# Patient Record
Sex: FEMALE | Race: WHITE | Employment: FULL TIME | URBAN - METROPOLITAN AREA
[De-identification: names, ages, dates, MRNs, and addresses within clinical notes are randomized per-mention and may not be internally consistent; named-entity substitution may affect disease eponyms.]

---

## 2017-02-10 ENCOUNTER — TELEPHONE (OUTPATIENT)
Dept: FAMILY MEDICINE | Facility: CLINIC | Age: 22
End: 2017-02-10

## 2017-02-10 DIAGNOSIS — Z30.41 ENCOUNTER FOR SURVEILLANCE OF CONTRACEPTIVE PILLS: Primary | ICD-10-CM

## 2017-02-10 RX ORDER — NORETHINDRONE ACETATE AND ETHINYL ESTRADIOL .03; 1.5 MG/1; MG/1
1 TABLET ORAL DAILY
Qty: 84 TABLET | Refills: 1 | Status: SHIPPED | OUTPATIENT
Start: 2017-02-10 | End: 2017-08-31

## 2017-08-31 DIAGNOSIS — Z30.41 ENCOUNTER FOR SURVEILLANCE OF CONTRACEPTIVE PILLS: ICD-10-CM

## 2017-08-31 NOTE — TELEPHONE ENCOUNTER
Microgestin     Last Written Prescription Date: 02/10/17  Last Fill Quantity: 84,  # refills: 1   Last Office Visit with [facility], [facility] or [facility] prescribing provider: 10/03/16

## 2017-09-06 RX ORDER — NORETHINDRONE ACETATE AND ETHINYL ESTRADIOL .03; 1.5 MG/1; MG/1
1 TABLET ORAL DAILY
Qty: 30 TABLET | Refills: 0 | Status: SHIPPED | OUTPATIENT
Start: 2017-09-06 | End: 2017-09-11

## 2017-09-06 NOTE — TELEPHONE ENCOUNTER
Patient left a voicemail today that we were unable to retrieve until earlier this evening. Message was left at 4:02pm requesting for a refill. Please call to discuss.    Lizet WALTERS  Central Scheduler

## 2017-09-11 ENCOUNTER — OFFICE VISIT (OUTPATIENT)
Dept: FAMILY MEDICINE | Facility: CLINIC | Age: 22
End: 2017-09-11
Payer: COMMERCIAL

## 2017-09-11 VITALS
SYSTOLIC BLOOD PRESSURE: 110 MMHG | BODY MASS INDEX: 26.12 KG/M2 | DIASTOLIC BLOOD PRESSURE: 75 MMHG | WEIGHT: 153 LBS | TEMPERATURE: 97.8 F | HEART RATE: 76 BPM | HEIGHT: 64 IN

## 2017-09-11 DIAGNOSIS — Z00.00 ROUTINE GENERAL MEDICAL EXAMINATION AT A HEALTH CARE FACILITY: Primary | ICD-10-CM

## 2017-09-11 DIAGNOSIS — Z30.41 ENCOUNTER FOR SURVEILLANCE OF CONTRACEPTIVE PILLS: ICD-10-CM

## 2017-09-11 PROCEDURE — 99395 PREV VISIT EST AGE 18-39: CPT | Performed by: NURSE PRACTITIONER

## 2017-09-11 PROCEDURE — 87491 CHLMYD TRACH DNA AMP PROBE: CPT | Performed by: NURSE PRACTITIONER

## 2017-09-11 PROCEDURE — 87591 N.GONORRHOEAE DNA AMP PROB: CPT | Performed by: NURSE PRACTITIONER

## 2017-09-11 RX ORDER — NORETHINDRONE ACETATE AND ETHINYL ESTRADIOL .03; 1.5 MG/1; MG/1
1 TABLET ORAL DAILY
Qty: 84 TABLET | Refills: 3 | Status: SHIPPED | OUTPATIENT
Start: 2017-09-11 | End: 2018-11-21

## 2017-09-11 NOTE — MR AVS SNAPSHOT
"              After Visit Summary   9/11/2017    Jackelin Durno    MRN: [id number]           Patient Information     Date Of Birth          1995        Visit Information        Provider Department      9/11/2017 10:40 AM Lizeth Weems APRN CNP [facility]        Today's Diagnoses     Routine general medical examination at a health care facility    -  1    Encounter for surveillance of contraceptive pills           Follow-ups after your visit        Who to contact     If you have questions or need follow up information about today's clinic visit or your schedule please contact [facility] directly at [phone].  Normal or non-critical lab and imaging results will be communicated to you by AquaMobile[employer], letter or phone within 4 business days after the clinic has received the results. If you do not hear from us within 7 days, please contact the clinic through AquaMobilehart or phone. If you have a critical or abnormal lab result, we will notify you by phone as soon as possible.  Submit refill requests through [employer] or call your pharmacy and they will forward the refill request to us. Please allow 3 business days for your refill to be completed.          Additional Information About Your Visit        MyChart Information     [employer] gives you secure access to your electronic health record. If you see a primary care provider, you can also send messages to your care team and make appointments. If you have questions, please call your primary care clinic.  If you do not have a primary care provider, please call [phone] and they will assist you.        Care EveryWhere ID     This is your Care EveryWhere ID. This could be used by other organizations to access your [place] medical records  [id number]        Your Vitals Were     Pulse Temperature Height Last Period BMI (Body Mass Index)       76 97.8  F (36.6  C) (Oral) 5' 4\" (1.626 m) 09/03/2017 (Exact Date) 26.26 kg/m2     "    Blood Pressure from Last 3 Encounters:   09/11/17 110/75   10/03/16 101/68   07/20/16 106/69    Weight from Last 3 Encounters:   09/11/17 153 lb (69.4 kg)   10/03/16 146 lb (66.2 kg)   10/12/15 142 lb (64.4 kg) (72 %)*     * Growth percentiles are based on [facility] 2-20 Years data.              We Performed the Following     CHLAMYDIA TRACHOMATIS PCR     NEISSERIA GONORRHOEA PCR          Today's Medication Changes          These changes are accurate as of: 9/11/17 10:58 AM.  If you have any questions, ask your nurse or doctor.               These medicines have changed or have updated prescriptions.        Dose/Directions    norethindrone-ethinyl estradiol 1.5-30 MG-MCG per tablet   Commonly known as:  MICROGESTIN   This may have changed:  additional instructions   Used for:  Encounter for surveillance of contraceptive pills   Changed by:  Lizeth Weems APRN CNP        Dose:  1 tablet   Take 1 tablet by mouth daily   Quantity:  84 tablet   Refills:  3            Where to get your medicines      These medications were sent to [facility] [address] [place] [address] [address] AT [facility] [place] [address], [facility] [address]     Phone:  [phone]     norethindrone-ethinyl estradiol 1.5-30 MG-MCG per tablet                Primary Care Provider Office Phone # Fax #    MAGUE Jeter [phone] [address] [facility] [address]        Equal Access to Services     NIMESH JEFFERSON : Hadii aad ku hadasho Soomaali, waaxda luqadaha, qaybta kaalmada adeegyada, waxay danial bhatt. So [facility] [phone].    ATENCIÓN: Si habla español, tiene a sharma disposición servicios gratuitos de asistencia lingüística. Roque al [phone].    We comply with applicable federal civil rights laws and [place] laws. We do not discriminate on the basis of race, color, national origin, age, disability sex, sexual orientation or  gender identity.            Thank you!     Thank you for choosing [facility]  for your care. Our goal is always to provide you with excellent care. Hearing back from our patients is one way we can continue to improve our services. Please take a few minutes to complete the written survey that you may receive in the mail after your visit with us. Thank you!             Your Updated Medication List - Protect others around you: Learn how to safely use, store and throw away your medicines at www.disposemymeds.org.          This list is accurate as of: 9/11/17 10:58 AM.  Always use your most recent med list.                   Brand Name Dispense Instructions for use Diagnosis    NO ACTIVE MEDICATIONS           norethindrone-ethinyl estradiol 1.5-30 MG-MCG per tablet    MICROGESTIN    84 tablet    Take 1 tablet by mouth daily    Encounter for surveillance of contraceptive pills

## 2017-09-11 NOTE — NURSING NOTE
"Chief Complaint   Patient presents with     Physical       Initial /75  Pulse 76  Temp 97.8  F (36.6  C) (Oral)  Ht 5' 4\" (1.626 m)  Wt 153 lb (69.4 kg)  LMP 09/03/2017 (Exact Date)  BMI 26.26 kg/m2 Estimated body mass index is 26.26 kg/(m^2) as calculated from the following:    Height as of this encounter: 5' 4\" (1.626 m).    Weight as of this encounter: 153 lb (69.4 kg).  Medication Reconciliation: complete  "

## 2017-09-11 NOTE — PROGRESS NOTES
SUBJECTIVE:   CC: Jackelin Duron is an 21 year old woman who presents for preventive health visit.     Physical   Annual:     Getting at least 3 servings of Calcium per day::  Yes    Bi-annual eye exam::  Yes    Dental care twice a year::  Yes    Sleep apnea or symptoms of sleep apnea::  None    Diet::  Regular (no restrictions)    Frequency of exercise::  2-3 days/week    Duration of exercise::  30-45 minutes    Taking medications regularly::  Yes    Medication side effects::  None    Additional concerns today::  YES        Today's PHQ-2 Score:   PHQ-2 ( 1999 [employer]) 9/10/2017   Q1: Little interest or pleasure in doing things 0   Q2: Feeling down, depressed or hopeless 0   PHQ-2 Score 0   Q1: Little interest or pleasure in doing things Not at all   Q2: Feeling down, depressed or hopeless Not at all   PHQ-2 Score 0       Abuse: Current or Past(Physical, Sexual or Emotional)- No  Do you feel safe in your environment - Yes    Social History   Substance Use Topics     Smoking status: Never Smoker     Smokeless tobacco: Never Used     Alcohol use Yes     The patient does not drink >3 drinks per day nor >7 drinks per week.    Reviewed orders with patient.  Reviewed health maintenance and updated orders accordingly - Yes          Pertinent mammograms are reviewed under the imaging tab.    History of abnormal Pap smear: NO - age 21-29 PAP every 3 years recommended    Reviewed and updated as needed this visit by clinical staff  Tobacco  Allergies  Meds         Reviewed and updated as needed this visit by Provider              ROS:C: NEGATIVE for fever, chills, change in weight  I: NEGATIVE for worrisome rashes, moles or lesions  E: NEGATIVE for vision changes or irritation  ENT: NEGATIVE for ear, mouth and throat problems  R: NEGATIVE for significant cough or SOB  B: NEGATIVE for masses, tenderness or discharge  CV: NEGATIVE for chest pain, palpitations or peripheral edema  GI: NEGATIVE for nausea, abdominal  "pain, heartburn, or change in bowel habits  : NEGATIVE for unusual urinary or vaginal symptoms. Periods are regular.  M: NEGATIVE for significant arthralgias or myalgia  N: NEGATIVE for weakness, dizziness or paresthesias  P: NEGATIVE for changes in mood or affect     OBJECTIVE:   /75  Pulse 76  Temp 97.8  F (36.6  C) (Oral)  Ht 5' 4\" (1.626 m)  Wt 153 lb (69.4 kg)  LMP 09/03/2017 (Exact Date)  BMI 26.26 kg/m2  EXAM: Patient has period with heavy flow today - declines pelvic exam; clothes kept on.  GENERAL: healthy, alert and no distress  EYES: Eyes grossly normal to inspection, PERRL and conjunctivae and sclerae normal  HENT: ear canals and TM's normal, nose and mouth without ulcers or lesions  NECK: no adenopathy, no asymmetry, masses, or scars and thyroid normal to palpation  RESP: lungs clear to auscultation - no rales, rhonchi or wheezes  CV: regular rate and rhythm, normal S1 S2, no S3 or S4, no murmur, click or rub, no peripheral edema and peripheral pulses strong  ABDOMEN: soft, nontender, no hepatosplenomegaly, no masses and bowel sounds normal  MS: no gross musculoskeletal defects noted, no edema  SKIN: no suspicious lesions or rashes  NEURO: Normal strength and tone, mentation intact and speech normal  PSYCH: mentation appears normal, affect normal/bright    ASSESSMENT/PLAN:       ICD-10-CM    1. Routine general medical examination at a health care facility Z00.00 NEISSERIA GONORRHOEA PCR     CHLAMYDIA TRACHOMATIS PCR   2. Encounter for surveillance of contraceptive pills Z30.41 norethindrone-ethinyl estradiol (MICROGESTIN) 1.5-30 MG-MCG per tablet       COUNSELING:  Reviewed preventive health counseling, as reflected in patient instructions         reports that she has never smoked. She has never used smokeless tobacco.    Estimated body mass index is 26.26 kg/(m^2) as calculated from the following:    Height as of this encounter: 5' 4\" (1.626 m).    Weight as of this encounter: 153 lb " (69.4 kg).   Weight management plan: Discussed healthy diet and exercise guidelines and patient will follow up in 12 months in clinic to re-evaluate.      The risks, benefits and treatment options of prescribed medications or other treatments have been discussed with the patient. The patient verbalized their understanding and should call or follow up if no improvement or if they develop further problems.    MAGUE Gonzalez [facility]

## 2017-09-12 LAB
C TRACH DNA SPEC QL NAA+PROBE: NEGATIVE
N GONORRHOEA DNA SPEC QL NAA+PROBE: NEGATIVE
SPECIMEN SOURCE: NORMAL
SPECIMEN SOURCE: NORMAL

## 2018-11-21 ENCOUNTER — OFFICE VISIT (OUTPATIENT)
Dept: OBGYN | Facility: CLINIC | Age: 23
End: 2018-11-21
Attending: OBSTETRICS & GYNECOLOGY

## 2018-11-21 ENCOUNTER — APPOINTMENT (OUTPATIENT)
Dept: LAB | Facility: CLINIC | Age: 23
End: 2018-11-21

## 2018-11-21 VITALS
BODY MASS INDEX: 26.5 KG/M2 | DIASTOLIC BLOOD PRESSURE: 78 MMHG | WEIGHT: 155.2 LBS | HEIGHT: 64 IN | SYSTOLIC BLOOD PRESSURE: 111 MMHG | HEART RATE: 77 BPM

## 2018-11-21 DIAGNOSIS — N91.5 OLIGOMENORRHEA, UNSPECIFIED TYPE: ICD-10-CM

## 2018-11-21 DIAGNOSIS — Z00.00 VISIT FOR PREVENTIVE HEALTH EXAMINATION: Primary | ICD-10-CM

## 2018-11-21 DIAGNOSIS — Z11.3 SCREENING EXAMINATION FOR VENEREAL DISEASE: ICD-10-CM

## 2018-11-21 PROCEDURE — 84270 ASSAY OF SEX HORMONE GLOBUL: CPT | Performed by: OBSTETRICS & GYNECOLOGY

## 2018-11-21 PROCEDURE — 87591 N.GONORRHOEAE DNA AMP PROB: CPT | Performed by: OBSTETRICS & GYNECOLOGY

## 2018-11-21 PROCEDURE — 83001 ASSAY OF GONADOTROPIN (FSH): CPT | Performed by: OBSTETRICS & GYNECOLOGY

## 2018-11-21 PROCEDURE — 84443 ASSAY THYROID STIM HORMONE: CPT | Performed by: OBSTETRICS & GYNECOLOGY

## 2018-11-21 PROCEDURE — 84146 ASSAY OF PROLACTIN: CPT | Performed by: OBSTETRICS & GYNECOLOGY

## 2018-11-21 PROCEDURE — G0463 HOSPITAL OUTPT CLINIC VISIT: HCPCS | Mod: ZF

## 2018-11-21 PROCEDURE — 87491 CHLMYD TRACH DNA AMP PROBE: CPT | Performed by: OBSTETRICS & GYNECOLOGY

## 2018-11-21 PROCEDURE — 84403 ASSAY OF TOTAL TESTOSTERONE: CPT | Performed by: OBSTETRICS & GYNECOLOGY

## 2018-11-21 PROCEDURE — 36415 COLL VENOUS BLD VENIPUNCTURE: CPT | Performed by: OBSTETRICS & GYNECOLOGY

## 2018-11-21 NOTE — PROGRESS NOTES
Progress Note    SUBJECTIVE:  Jackelin Duron is an 22 year old, G0, Patient's last menstrual period was 11/12/2018 (approximate)., who requests an Annual Preventive Exam.     Has had cysts on ovaries since high school, was put on OCs to help control that. She recently came off of OCs this past June and has only had three cycles until now. Prior to ever starting OCs, also noted irregular cycles. Several women in her family have been diagnosed with PCOS, and she is wondering if she also has it and what this would mean for future implications, joni fertility.     Getting  soon, thinking of maybe starting a family in 2 years or so.     S/p Gardasil vaccine x 3 per patient  No history of STDs  Sexually active with stephen now, 5 total lifetime partners. Reports has had negative STI testing since being with stephen.      MEDICAL HISTORY:    Current Outpatient Prescriptions on File Prior to Visit:  NO ACTIVE MEDICATIONS      No current facility-administered medications on file prior to visit.   No Known Allergies  Immunization History   Administered Date(s) Administered     DTAP (<7y) 09/04/2001     HPV 08/11/2008, 10/28/2008, 08/04/2010     Influenza (IIV3) PF 11/04/2005, 10/28/2008     MMR 09/04/2001     Meningococcal (Menactra ) 04/24/2013     Meningococcal (Menomune ) 08/11/2008     Poliovirus, inactivated (IPV) 09/04/2001     Tdap (Adacel,Boostrix) 08/11/2008     Varicella 09/04/2001, 08/11/2008       Obstetric History     No data available     Past Medical History:   Diagnosis Date     Concussion 2010    x2     No past surgical history on file.  Family History   Problem Relation Age of Onset     Hypertension Maternal Grandfather      Hypertension Paternal Grandfather      HEART DISEASE Father      wpw     Anxiety Disorder Father      Depression Mother      Anxiety Disorder Mother      Peripheral Vascular Disease Maternal Grandmother      Thyroid Disease Maternal Grandmother      Hyperlipidemia Paternal  "Grandmother      C.A.D. No family hx of      Breast Cancer No family hx of      Social History     Social History     Marital status: Single     Spouse name: N/A     Number of children: N/A     Years of education: N/A     Social History Main Topics     Smoking status: Never Smoker     Smokeless tobacco: Never Used     Alcohol use Yes     Drug use: No     Sexual activity: Yes     Partners: Male     Birth control/ protection: Condom, Pill     Other Topics Concern     Parent/Sibling W/ Cabg, Mi Or Angioplasty Before 65f 55m? No     Social History Narrative        ROS: 10 point ROS neg other than the symptoms noted above in the HPI.      PHYSICAL EXAM:  Height 1.626 m (5' 4\"), weight 70.4 kg (155 lb 3.2 oz), last menstrual period 11/12/2018. Body mass index is 26.64 kg/(m^2).  General - Well-nourished, pleasant female in no acute distress  Skin - no suspicious lesions or rashes  Neck - supple without lymphadenopathy. No thyromegaly or palpable nodules  Lungs - clear to auscultation bilaterally  Heart - regular rate and rhythm without murmur  Abdomen - soft, nontender, nondistended, no masses or organomegaly noted  Neurological - grossly normal strength. Normal mental status    Breast Exam:  Breasts: Without visible skin changes. No dimpling or lesions seen.   Breasts supple, non-tender with palpation, no dominant mass, nodularity, or nipple discharge noted bilaterally. Axillary and supraclavicular nodes negative bilaterally.      Pelvic Exam:  EG/BUS: Normal genital architecture without lesions, erythema or abnormal secretions Bartholin's, Urethra, [place]'s normal   Bladder: no masses or tenderness  Vagina: moist, pink, rugae with creamy, white and odorless secretions  Cervix: pink, moist, closed, without lesion or CMT  Uterus: anteverted, midposition, and small, smooth, firm, mobile w/o pain  Adnexa: within normal limits and no masses, nodularity, tenderness  Rectum: anus normal     Pap done at [facility] last year, " normal per patient    ASSESSMENT:  Encounter Diagnoses   Name Primary?     Oligomenorrhea, unspecified type Yes     Visit for preventive health examination      Screening examination for venereal disease         PLAN:   Orders Placed This Encounter   Procedures     US GYN Complete Transvaginal - 64705 (In Clinic)     Follicle Stimulating Hormone     TSH with free T4 reflex     Testosterone Free and Total     Prolactin     -pelvic/breast exam wnl today (no pap done due to patient report of last years' being normal). GC/CT done today.  -discussed PCOS briefly. Will complete lab/US workup and MyChart with results. We also discussed may be helpful to come in to discuss future implications in detail if she would like, if she meets criteria for diagnosis. We also discussed other screening labs (DM, cholesterol, etc) if she has diagnosis.   -oligomenorrhea - she would like to start OCs again once labwork is completed, to help with regulation of cycles. Has no known contraindications at this time to OCs (MAunt with hx DVT/genetic mutation. Patient's mother has been tested and is negative for mutation).     Will MyChart with lab/US results    Beba Magallanes MD  11/21/2018

## 2018-11-21 NOTE — PATIENT INSTRUCTIONS

## 2018-11-21 NOTE — LETTER
11/21/2018       RE: Jackelin Duron  [address] [facility] [place] [address]     Dear Colleague,    Thank you for referring your patient, Jackelin Duron, to the [facility] at [facility]. Please see a copy of my visit note below.    Progress Note    SUBJECTIVE:  Jackelin Duron is an 22 year old, G0, Patient's last menstrual period was 11/12/2018 (approximate)., who requests an Annual Preventive Exam.     Has had cysts on ovaries since high school, was put on OCs to help control that. She recently came off of OCs this past June and has only had three cycles until now. Prior to ever starting OCs, also noted irregular cycles. Several women in her family have been diagnosed with PCOS, and she is wondering if she also has it and what this would mean for future implications, joni fertility.     Getting  soon, thinking of maybe starting a family in 2 years or so.     S/p Gardasil vaccine x 3 per patient  No history of STDs  Sexually active with stephen now, 5 total lifetime partners. Reports has had negative STI testing since being with stephen.    MEDICAL HISTORY:    Current Outpatient Prescriptions on File Prior to Visit:  NO ACTIVE MEDICATIONS      No current facility-administered medications on file prior to visit.   No Known Allergies  Immunization History   Administered Date(s) Administered     DTAP (<7y) 09/04/2001     HPV 08/11/2008, 10/28/2008, 08/04/2010     Influenza (IIV3) PF 11/04/2005, 10/28/2008     MMR 09/04/2001     Meningococcal (Menactra ) 04/24/2013     Meningococcal (Menomune ) 08/11/2008     Poliovirus, inactivated (IPV) 09/04/2001     Tdap (Adacel,Boostrix) 08/11/2008     Varicella 09/04/2001, 08/11/2008       Obstetric History     No data available     Past Medical History:   Diagnosis Date     Concussion 2010    x2     No past surgical history on file.  Family History   Problem Relation Age of Onset     Hypertension Maternal  "Grandfather      Hypertension Paternal Grandfather      HEART DISEASE Father      wpw     Anxiety Disorder Father      Depression Mother      Anxiety Disorder Mother      Peripheral Vascular Disease Maternal Grandmother      Thyroid Disease Maternal Grandmother      Hyperlipidemia Paternal Grandmother      C.A.D. No family hx of      Breast Cancer No family hx of      Social History     Social History     Marital status: Single     Spouse name: N/A     Number of children: N/A     Years of education: N/A     Social History Main Topics     Smoking status: Never Smoker     Smokeless tobacco: Never Used     Alcohol use Yes     Drug use: No     Sexual activity: Yes     Partners: Male     Birth control/ protection: Condom, Pill     Other Topics Concern     Parent/Sibling W/ Cabg, Mi Or Angioplasty Before 65f 55m? No     Social History Narrative     ROS: 10 point ROS neg other than the symptoms noted above in the HPI.    PHYSICAL EXAM:  Height 1.626 m (5' 4\"), weight 70.4 kg (155 lb 3.2 oz), last menstrual period 11/12/2018. Body mass index is 26.64 kg/(m^2).  General - Well-nourished, pleasant female in no acute distress  Skin - no suspicious lesions or rashes  Neck - supple without lymphadenopathy. No thyromegaly or palpable nodules  Lungs - clear to auscultation bilaterally  Heart - regular rate and rhythm without murmur  Abdomen - soft, nontender, nondistended, no masses or organomegaly noted  Neurological - grossly normal strength. Normal mental status    Breast Exam:  Breasts: Without visible skin changes. No dimpling or lesions seen.   Breasts supple, non-tender with palpation, no dominant mass, nodularity, or nipple discharge noted bilaterally. Axillary and supraclavicular nodes negative bilaterally.      Pelvic Exam:  EG/BUS: Normal genital architecture without lesions, erythema or abnormal secretions Bartholin's, Urethra, [employer]'s normal   Bladder: no masses or tenderness  Vagina: moist, pink, rugae with creamy, " white and odorless secretions  Cervix: pink, moist, closed, without lesion or CMT  Uterus: anteverted, midposition, and small, smooth, firm, mobile w/o pain  Adnexa: within normal limits and no masses, nodularity, tenderness  Rectum: anus normal     Pap done at [facility] last year, normal per patient    ASSESSMENT:  Encounter Diagnoses   Name Primary?     Oligomenorrhea, unspecified type Yes     Visit for preventive health examination      Screening examination for venereal disease       PLAN:   Orders Placed This Encounter   Procedures     US GYN Complete Transvaginal - 54571 (In Clinic)     Follicle Stimulating Hormone     TSH with free T4 reflex     Testosterone Free and Total     Prolactin     -pelvic/breast exam wnl today (no pap done due to patient report of last years' being normal). GC/CT done today.  -discussed PCOS briefly. Will complete lab/US workup and MyChart with results. We also discussed may be helpful to come in to discuss future implications in detail if she would like, if she meets criteria for diagnosis. We also discussed other screening labs (DM, cholesterol, etc) if she has diagnosis.   -oligomenorrhea - she would like to start OCs again once labwork is completed, to help with regulation of cycles. Has no known contraindications at this time to OCs (MAunt with hx DVT/genetic mutation. Patient's mother has been tested and is negative for mutation).     Will MyChart with lab/US results    Beba Magallanes MD  11/21/2018

## 2018-11-21 NOTE — MR AVS SNAPSHOT
After Visit Summary   11/21/2018    Jackelin Duron    MRN: [id number]           Patient Information     Date Of Birth          1995        Visit Information        Provider Department      11/21/2018 3:00 PM Beba Magallanes MD [facility]        Today's Diagnoses     Visit for preventive health examination    -  1    Oligomenorrhea, unspecified type        Screening examination for venereal disease          Care Instructions      PREVENTIVE HEALTH RECOMMENDATIONS:   Most women need a yearly breast and pelvic exam.    A PAP screen, a test done DURING a pelvic exam, is NO longer recommended yearly.    March 2013, screening guidelines recommended by ACOG for PAP screen are:    1) First pap at age 21.    2) Pap every 3 years until age 30.    3) After age 30, pap every 3 years or Pap with HR HPV screen every 5 years until age 65.  4) Women do NOT need a vaginal Pap screen after a hysterectomy (surgical removal of the uterus) when they have not had cancer.    Exceptions:  1) Yearly pap if HIV+ or immunosuppressed secondary to organ transplant  2) BASIL II-III continue routine screening for 20 years.    I encourage you continue looking for opportunities to choose a healthy lifestyle:       * Choose to eat a heart healthy diet. Check out the FOOD PLATE guidelines at: http://www.choosemyplate.gov/ for helpful hints on weight and cholesterol management.  Balance your caloric intake with exercise to maintain a BMI in the 22 to 26 range. For bone health: Eat calcium-rich foods like yogurt, broccoli or take chewable calcium pills (500 to 600 mg) twice a day with food.       * Exercise for at least an average of 30 minutes a day, 5 days of the week. This will help you control your weight, release stress, and help prevent disease.      * Take a Vitamin D3 supplement daily fall through spring and during summer unless you icqa61-47' full body sun exposure to skin without sunscreen.       * DO wear sunscreen to prevent skin cancer after the first 15-30 minutes.      * Identify stressors in your life, find ways to release the stress, and, make time for yourself. PLEASE ask for help if mood changes last longer than two weeks.     * Limit alcohol to one drink per day.  No smoking.  Avoid second hand smoke. If you smoke, ask for help to stop.       *  If you are in a sexual relationship, talk with your partner about possible infection risks and take action to protect yourself from exposure to a sexual infection.    Please request an infection screen for STIs (sexually transmitted infections) if you are less than age 26 OR believe that you may be at risk.     Get a flu shot each year. Get a tetanus shot every 10 years. EVERYONE needs a pertussis (Whooping cough) booster.    See your dentist twice a year for an exam and preventive care cleaning.     Consider the following screen tests:    1) cholesterol test every 5 years.     2) yearly mammogram after age 40 unless you have identified risks.    3) colonoscopy every 10 years after age 50 unless you have identified risks.    4) diabetes blood test screening if you are at risk for diabetes.      Additional information that you may also find helpful:  The Internet now gives us access to LOTS of information -- some of it helpful, research documented and also plenty of harmful, anecdotal information that may not pertain to your situtaion. Consider visiting the following websites for accurate health information:    www.vitamindcouncil.org/ : Info and ongoing research re Vitamin D    www.fairview.org : Up to date and easily searchable information on multiple topics.    www.medlineplus.gov : medication info, interactive tutorials, watch real surgeries online    www.cdc.gov : public health info, travel advisories, epidemics (H1N1)    www.lauren/std.org: current research re diagnosis, treatment and prevention of sexually contacted  infections.    www.health.state.[place] : [place] dept of heat, public health issues in [place], N1N1    www.familydoctor.org : good info from the Academy of Family Physicians                Follow-ups after your visit        Your next 10 appointments already scheduled     Dec 24, 2018  9:45 AM CST   US PELVIC COMPLETE WITH TRANSVAGINAL PORTABLE with WYUS1   [facility] Ul[facility]ound ([facility])    [address] [place]  [facility] [address]   [phone]           How do I prepare for my exam? (Food and drink instructions) Adults: Drink four 8-ounce glasses of fluid. Finish drinking an hour before your exam, so you have a full bladder. If you need to empty your bladder before your exam, try to release only a little urine. Then, drink another glass of fluid.  Children: * Children who are potty trained up to 6 years old should drink at least 2 cups (16 oz) of water/non-carbonated beverage 30 minutes prior to the exam. * Children who are 6-10 years should drink at least 3 cups (24 oz) of water/non-carbonated beverage 45 minutes prior to the exam. * Children who are 10 years or older should drink at least 4 cups (32 oz) of water/non-carbonated beverage 45 minutes prior to the exam.  If your child is very uncomfortable or has an urgent need to pee, please notify a technologist; they will try to find out how much longer the wait may be and provide instructions to help relieve the pressure.  What should I wear: Wear comfortable clothes.  How long does the exam take: Most ultrasounds take 30 to 60 minutes.  What should I bring: Bring a list of your medicines, including vitamins, minerals and over-the-counter drugs. It is safest to leave personal items at home.  Do I need a :  No  is needed.  What do I need to tell my doctor: Tell your doctor about any allergies you may have.  What should I do after the exam: No restrictions, you may resume normal activities.  What is this test: An ultrasound uses  "sound waves to make pictures of the body. Sound waves do not cause pain. The only discomfort may be the pressure of the wand against your skin or full bladder.  Who should I call with questions: If you have any questions, please call the Imaging Department where you will have your exam. Directions, parking instructions, and other information are available on our website, [email or web address].              Who to contact     Please call your clinic at [phone] to:    Ask questions about your health    Make or cancel appointments    Discuss your medicines    Learn about your test results    Speak to your doctor            Additional Information About Your Visit        [employer] Information     [employer] gives you secure access to your electronic health record. If you see a primary care provider, you can also send messages to your care team and make appointments. If you have questions, please call your primary care clinic.  If you do not have a primary care provider, please call [phone] and they will assist you.      [employer] is an electronic gateway that provides easy, online access to your medical records. With [employer], you can request a clinic appointment, read your test results, renew a prescription or communicate with your care team.     To access your existing account, please contact your [facility] or call [phone] for assistance.        Care EveryWhere ID     This is your Care EveryWhere ID. This could be used by other organizations to access your [facility] medical records  [id number]        Your Vitals Were     Pulse Height Last Period BMI (Body Mass Index)          77 1.626 m (5' 4\") 11/12/2018 (Approximate) 26.64 kg/m2         Blood Pressure from Last 3 Encounters:   11/21/18 111/78   09/11/17 110/75   10/03/16 101/68    Weight from Last 3 Encounters:   11/21/18 70.4 kg (155 lb 3.2 oz)   09/11/17 69.4 kg (153 lb)   10/03/16 66.2 kg (146 lb)              We Performed " the Following     Chlamydia PCR (Clinic Collect)     Follicle Stimulating Hormone     Neisseria gonorrhoeae PCR     Prolactin     Testosterone Free and Total     TSH with free T4 reflex          Today's Medication Changes          These changes are accurate as of 11/21/18 11:59 PM.  If you have any questions, ask your nurse or doctor.               Stop taking these medicines if you haven't already. Please contact your care team if you have questions.     norethindrone-ethinyl estradiol 1.5-30 MG-MCG per tablet   Commonly known as:  MICROGESTIN   Stopped by:  Beba Magallanes MD                    Primary Care Provider Office Phone # Fax #    Lizeth Olivarezedenilson Weems, APRN [facility] [phone] [address] [address]        Equal Access to Services     [facility] RONNY : Julian olseno Soshaqali, waaxda luqadaha, qaybta kaalmada adeegyada, mario rodriguez . So [facility] [phone].    ATENCIÓN: Si habla español, tiene a sharma disposición servicios gratuitos de asistencia lingüística. [facility] [phone].    We comply with applicable federal civil rights laws and [place] laws. We do not discriminate on the basis of race, color, national origin, age, disability, sex, sexual orientation, or gender identity.            Thank you!     Thank you for choosing [facility]  for your care. Our goal is always to provide you with excellent care. Hearing back from our patients is one way we can continue to improve our services. Please take a few minutes to complete the written survey that you may receive in the mail after your visit with us. Thank you!             Your Updated Medication List - Protect others around you: Learn how to safely use, store and throw away your medicines at www.disposemymeds.org.          This list is accurate as of 11/21/18 11:59 PM.  Always use your most recent med list.                   Brand Name Dispense Instructions  for use Diagnosis    NO ACTIVE MEDICATIONS

## 2018-11-23 LAB
FSH SERPL-ACNC: 5.2 IU/L
PROLACTIN SERPL-MCNC: 7 UG/L (ref 3–27)
TSH SERPL DL<=0.005 MIU/L-ACNC: 0.9 MU/L (ref 0.4–4)

## 2018-11-27 LAB
SHBG SERPL-SCNC: 64 NMOL/L (ref 30–135)
TESTOST FREE SERPL-MCNC: 0.48 NG/DL (ref 0.08–0.74)
TESTOST SERPL-MCNC: 43 NG/DL (ref 8–60)

## 2018-12-24 ENCOUNTER — HOSPITAL ENCOUNTER (OUTPATIENT)
Dept: ULTRASOUND IMAGING | Facility: CLINIC | Age: 23
Discharge: HOME | End: 2018-12-24
Attending: OBSTETRICS & GYNECOLOGY | Admitting: OBSTETRICS & GYNECOLOGY
Payer: COMMERCIAL

## 2018-12-24 DIAGNOSIS — N91.5 OLIGOMENORRHEA, UNSPECIFIED TYPE: ICD-10-CM

## 2018-12-24 PROCEDURE — 76856 US EXAM PELVIC COMPLETE: CPT

## 2019-01-16 DIAGNOSIS — E28.2 PCOS (POLYCYSTIC OVARIAN SYNDROME): Primary | ICD-10-CM

## 2019-01-16 RX ORDER — NORETHINDRONE ACETATE AND ETHINYL ESTRADIOL .03; 1.5 MG/1; MG/1
1 TABLET ORAL DAILY
Qty: 84 TABLET | Refills: 3 | Status: SHIPPED | OUTPATIENT
Start: 2019-01-16 | End: 2019-09-23

## 2019-02-05 ENCOUNTER — TELEPHONE (OUTPATIENT)
Dept: OTHER | Facility: CLINIC | Age: 24
End: 2019-02-05

## 2019-02-05 NOTE — TELEPHONE ENCOUNTER
2/5/2019    Call Regarding Onboarding: P1 - Other    Attempt 1    Message left with patient     Comments: No Dep, Requested Call Back      Outreach   LEO

## 2019-02-05 NOTE — TELEPHONE ENCOUNTER
2/5/2019    Call Regarding Onboarding: P1 - Other    Attempt 2    Message on voicemail     Comments: No Dep, Fulfilled Callback Request      Outreach   LEO

## 2019-02-19 NOTE — TELEPHONE ENCOUNTER
2/19/2019    Call Regarding Onboarding P1 Other    Attempt 3    Message on voicemail     Comments: 0 DEP      Outreach   CC

## 2019-09-23 ENCOUNTER — OFFICE VISIT (OUTPATIENT)
Dept: OBGYN | Facility: OTHER | Age: 24
End: 2019-09-23
Payer: COMMERCIAL

## 2019-09-23 VITALS
HEART RATE: 70 BPM | DIASTOLIC BLOOD PRESSURE: 70 MMHG | BODY MASS INDEX: 29.06 KG/M2 | HEIGHT: 63 IN | SYSTOLIC BLOOD PRESSURE: 110 MMHG | WEIGHT: 164 LBS

## 2019-09-23 DIAGNOSIS — E28.2 PCOS (POLYCYSTIC OVARIAN SYNDROME): ICD-10-CM

## 2019-09-23 DIAGNOSIS — Z00.00 ANNUAL PHYSICAL EXAM: Primary | ICD-10-CM

## 2019-09-23 LAB — HBA1C MFR BLD: 4.8 % (ref 0–5.6)

## 2019-09-23 PROCEDURE — 36415 COLL VENOUS BLD VENIPUNCTURE: CPT | Performed by: OBSTETRICS & GYNECOLOGY

## 2019-09-23 PROCEDURE — 83036 HEMOGLOBIN GLYCOSYLATED A1C: CPT | Performed by: OBSTETRICS & GYNECOLOGY

## 2019-09-23 PROCEDURE — 99385 PREV VISIT NEW AGE 18-39: CPT | Performed by: OBSTETRICS & GYNECOLOGY

## 2019-09-23 PROCEDURE — G0145 SCR C/V CYTO,THINLAYER,RESCR: HCPCS | Performed by: OBSTETRICS & GYNECOLOGY

## 2019-09-23 PROCEDURE — 87591 N.GONORRHOEAE DNA AMP PROB: CPT | Performed by: OBSTETRICS & GYNECOLOGY

## 2019-09-23 PROCEDURE — 87491 CHLMYD TRACH DNA AMP PROBE: CPT | Performed by: OBSTETRICS & GYNECOLOGY

## 2019-09-23 RX ORDER — NORETHINDRONE ACETATE AND ETHINYL ESTRADIOL .03; 1.5 MG/1; MG/1
1 TABLET ORAL DAILY
Qty: 84 TABLET | Refills: 11 | Status: SHIPPED | OUTPATIENT
Start: 2019-09-23 | End: 2022-05-02

## 2019-09-23 ASSESSMENT — MIFFLIN-ST. JEOR: SCORE: 1475.41

## 2019-09-23 NOTE — NURSING NOTE
"Chief Complaint   Patient presents with     Physical       Initial /70 (BP Location: Right arm, Patient Position: Chair, Cuff Size: Adult Regular)   Pulse 70   Ht 1.612 m (5' 3.47\")   Wt 74.4 kg (164 lb)   LMP 09/03/2019 (Approximate)   BMI 28.63 kg/m   Estimated body mass index is 28.63 kg/m  as calculated from the following:    Height as of this encounter: 1.612 m (5' 3.47\").    Weight as of this encounter: 74.4 kg (164 lb).  BP completed using cuff size: regular    No obstetric history on file.    The following HM Due: pap smear  Chalmydia (13-24)      The following patient reported/Care Every where data was sent to:  P ABSTRACT QUALITY INITIATIVES [[id number]]       patient has appointment for today    Ivette Gonzalez CMA  September 23, 2019           "

## 2019-09-24 NOTE — PROGRESS NOTES
Clinic Note    CC:    Chief Complaint   Patient presents with     Physical      HPI:  Jackelin Sal is a 23 year old  who presents for an annual exam, no specific complaints nor concerns.   She is recently , male partner, feels safe. She is a . Works out most days of the week. She has PCOS, struggles with weight. She has been on OCPs or Depo provera since age 16, periods were very irregular prior to this. She continues to take OCPs, intermittent extended cycle. Plans to start a family in a couple of years.     Ob Hx:    Gyn Hx: Patient's last menstrual period was 2019 (approximate).  Menses as above   Last pap NIL (10/16)   Using OCPs for birth control  PMH:   Past Medical History:   Diagnosis Date     Concussion 2010    x2     SurgHx:   Past Surgical History:   Procedure Laterality Date     wisedome teeth       FamHx:   Family History   Problem Relation Age of Onset     Hypertension Maternal Grandfather      Deep Vein Thrombosis Maternal Grandfather 40     Hypertension Paternal Grandfather      Heart Disease Father         wpw     Anxiety Disorder Father      Depression Mother      Anxiety Disorder Mother      Peripheral Vascular Disease Maternal Grandmother      Thyroid Disease Maternal Grandmother      Hyperlipidemia Paternal Grandmother      Deep Vein Thrombosis Maternal Aunt 40        has genetic mutation for clotting     C.A.D. No family hx of      Breast Cancer No family hx of      SocHx:   Social History     Socioeconomic History     Marital status:      Spouse name: None     Number of children: None     Years of education: None     Highest education level: None   Occupational History     None   Social Needs     Financial resource strain: None     Food insecurity:     Worry: None     Inability: None     Transportation needs:     Medical: None     Non-medical: None   Tobacco Use     Smoking status: Never Smoker     Smokeless tobacco: Never Used  "  Substance and Sexual Activity     Alcohol use: Yes     Drug use: No     Sexual activity: Yes     Partners: Male     Birth control/protection: Condom, Pill   Lifestyle     Physical activity:     Days per week: None     Minutes per session: None     Stress: None   Relationships     Social connections:     Talks on phone: None     Gets together: None     Attends Alevism service: None     Active member of club or organization: None     Attends meetings of clubs or organizations: None     Relationship status: None     Intimate partner violence:     Fear of current or ex partner: None     Emotionally abused: None     Physically abused: None     Forced sexual activity: None   Other Topics Concern     Parent/sibling w/ CABG, MI or angioplasty before 65F 55M? No   Social History Narrative     None     Allergies:   Patient has no known allergies.  Current Medications:   Current Outpatient Medications   Medication Sig Dispense Refill     norethindrone-ethinyl estradiol (MICROGESTIN 1.5/30) 1.5-30 MG-MCG tablet Take 1 tablet by mouth daily 84 tablet 11     NO ACTIVE MEDICATIONS          ROS: 10 point ROS negative other than as noted in the HPI    EXAM:  Vitals:    09/23/19 1712   BP: 110/70   BP Location: Right arm   Patient Position: Chair   Cuff Size: Adult Regular   Pulse: 70   Weight: 74.4 kg (164 lb)   Height: 1.612 m (5' 3.47\")    Body mass index is 28.63 kg/m .    Gen: Alert, oriented, appropriately interactive, NAD  CV: RRR, no murmurs, no extra heart sounds, 2+ peripheral pulses  Resp: CTAB, good effort without distress   Breasts: no axillary adenopathy, no dominant masses, no skin changes, no nipple discharge, nontender  Abdomen: soft, obese, non tender, non distended, no masses, no hernias. No inguinal lymphadenopathy.   Perineum: no lesions; normal appearing external genitalia, bartholins glands, urethra, skenes glands  Vagina: no masses or lesions or discharge, normally rugated.  Cervix: no masses or lesions or " discharge   Bimanual exam:   Nontender pelvic floor muscles  Uterus: midline, anteverted, small, mobile  no masses, non-tender  Adnexa: no masses or tenderness appreciated   No cervical motion tenderness  MSK: normal gait, symmetric movements UE & LE  Lower extremities: non-tender, no edema    Labs & Imaging:  Results for orders placed or performed in visit on 19 (from the past 24 hour(s))   Hemoglobin A1c   Result Value Ref Range    Hemoglobin A1C 4.8 0 - 5.6 %     Lab Results   Component Value Date    PAP NIL 10/03/2016     Pelvic Ultrasound (): Normal uterus [id number], 1mm EMS, polycystic ovaries    Labs from : FSH 5.2, TSH 0.90, Prolactin 7, T total 43, T free 0.48    ASSESSMENT/PLAN: Jackelin Sal is a 23 year old  who presents for an annual exam    1. Annual physical exam  - Normal PE today  - norethindrone-ethinyl estradiol (MICROGESTIN 1.5/30) 1.5-30 MG-MCG tablet; Take 1 tablet by mouth daily  Dispense: 84 tablet; Refill: 11  - Pap imaged thin layer screen only - recommended age 21 - 24 years  - Hemoglobin A1c  - Lipid panel reflex to direct LDL Fasting; Future  - Chlamydia trachomatis PCR  - Neisseria gonorrhoeae PCR    2. PCOS (polycystic ovarian syndrome)  - Recommending OCPs until desires pregnancy  - Weight loss counseling given  - Labs as above  - norethindrone-ethinyl estradiol (MICROGESTIN 1.5/30) 1.5-30 MG-MCG tablet; Take 1 tablet by mouth daily  Dispense: 84 tablet; Refill: 11    Nasim Santana MD  2019 9:25 PM

## 2019-09-26 LAB
COPATH REPORT: NORMAL
PAP: NORMAL

## 2019-11-09 ENCOUNTER — HEALTH MAINTENANCE LETTER (OUTPATIENT)
Age: 24
End: 2019-11-09

## 2019-12-08 ENCOUNTER — OFFICE VISIT (OUTPATIENT)
Dept: URGENT CARE | Facility: RETAIL CLINIC | Age: 24
End: 2019-12-08
Payer: COMMERCIAL

## 2019-12-08 VITALS
OXYGEN SATURATION: 100 % | SYSTOLIC BLOOD PRESSURE: 116 MMHG | TEMPERATURE: 98.8 F | HEART RATE: 100 BPM | RESPIRATION RATE: 16 BRPM | DIASTOLIC BLOOD PRESSURE: 73 MMHG

## 2019-12-08 DIAGNOSIS — M10.9 ACUTE GOUTY ARTHROPATHY: Primary | ICD-10-CM

## 2019-12-08 PROCEDURE — 99212 OFFICE O/P EST SF 10 MIN: CPT | Performed by: FAMILY MEDICINE

## 2019-12-08 RX ORDER — INDOMETHACIN 50 MG/1
CAPSULE ORAL
Qty: 14 CAPSULE | Refills: 1 | Status: SHIPPED | OUTPATIENT
Start: 2019-12-08 | End: 2020-05-25

## 2019-12-08 NOTE — PROGRESS NOTES
SUBJECTIVE:  Patient presents with pain in rt great toe for 1 month.  No known injury.  No history of joint disease.    Past Medical History:   Diagnosis Date     Concussion 2010    x2     Current Outpatient Medications   Medication Sig Dispense Refill     indomethacin (INDOCIN) 50 MG capsule One capsule 2 times daily with meals.  Take for one day after pain has resolved. Or 7 days 14 capsule 1     norethindrone-ethinyl estradiol (MICROGESTIN 1.5/30) 1.5-30 MG-MCG tablet Take 1 tablet by mouth daily 84 tablet 11     NO ACTIVE MEDICATIONS        History   Smoking Status     Never Smoker   Smokeless Tobacco     Never Used         OBJECITVE;  /73 (BP Location: Left arm, Patient Position: Sitting, Cuff Size: Adult Regular)   Pulse 100   Temp 98.8  F (37.1  C) (Oral)   Resp 16   SpO2 100%   MP joint rt great toe very tender with minimal swelling and no significant redness.    ASSESSMENT:  Gouty arthropathy    PLAN:  Indocin 50 mg bid with food for one day after pain resolves, or 7 days.  Follow up with primary care provider to recheck and uric acid.  Given printed material on gout and diet.

## 2020-03-12 DIAGNOSIS — Z00.00 ANNUAL PHYSICAL EXAM: ICD-10-CM

## 2020-03-12 DIAGNOSIS — E28.2 PCOS (POLYCYSTIC OVARIAN SYNDROME): ICD-10-CM

## 2020-03-12 RX ORDER — NORETHINDRONE ACETATE AND ETHINYL ESTRADIOL .03; 1.5 MG/1; MG/1
1 TABLET ORAL DAILY
Qty: 84 TABLET | Refills: 11 | Status: CANCELLED | OUTPATIENT
Start: 2020-03-12

## 2020-03-12 NOTE — TELEPHONE ENCOUNTER
"Contraceptives Protocol Passed     Rerun Protocol  (3/12/2020 3:25 PM)       Patient is not a current smoker if age is 35 or older         Recent (12 mo) or future (30 days) visit within the authorizing provider's specialty     Patient has had an office visit with the authorizing provider or a provider within the authorizing providers department within the previous 12 mos or has a future within next 30 days. See \"Patient Info\" tab in inbasket, or \"Choose Columns\" in Meds & Orders section of the refill encounter.              Medication is active on med list         No active pregnancy on record         No positive pregnancy test in past 12 months        Medication:   MICROGESTIN  Last Written Prescription Date:  9/23/2019  Last Fill Quantity: 84,   # refills: 11  Last Office Visit: 9/23/2019    Refill not appropriate at this time. Patient should have refills remaining at pharmacy. RN will let patient know she can call and request a transfer of prescription to her preferred pharmacy.     RN left detailed message on voicemail advising her to call and request transfer as stated above.     Roula Roldan RN on 3/12/2020 at 3:55 PM    "

## 2020-03-12 NOTE — TELEPHONE ENCOUNTER
Patient returned call and spoke to RN. She called [facility] at [facility] and states they are saying they don't have any refills remaining on that prescription. RN called the [facility], prescription was under patient's other last name. Will transfer to [facility] in [place] as requested. RN called and updated patient.     Roula Roldan RN on 3/12/2020 at 4:25 PM

## 2020-03-12 NOTE — TELEPHONE ENCOUNTER
Reason for Call:  Medication or medication refill:    Do you use a [facility]?  Name of the pharmacy and phone number for the current request:  [facility] - [phone]    Name of the medication requested: microgestin    Other request: Patient states zoraida is faxing refill request, but she wants refill sent to [facility] instead    Can we leave a detailed message on this number? YES    Phone number patient can be reached at: Home number on file [phone] (home)    Best Time: any    Call taken on 3/12/2020 at 3:17 PM by Loree Smith

## 2020-05-25 ENCOUNTER — OFFICE VISIT (OUTPATIENT)
Dept: URGENT CARE | Facility: URGENT CARE | Age: 25
End: 2020-05-25
Payer: COMMERCIAL

## 2020-05-25 VITALS
SYSTOLIC BLOOD PRESSURE: 116 MMHG | OXYGEN SATURATION: 99 % | HEART RATE: 101 BPM | RESPIRATION RATE: 20 BRPM | DIASTOLIC BLOOD PRESSURE: 74 MMHG | TEMPERATURE: 99.1 F | WEIGHT: 159.6 LBS | BODY MASS INDEX: 27.86 KG/M2

## 2020-05-25 DIAGNOSIS — L27.0 DERMATITIS DUE TO DRUG REACTION: Primary | ICD-10-CM

## 2020-05-25 PROCEDURE — 99213 OFFICE O/P EST LOW 20 MIN: CPT | Performed by: NURSE PRACTITIONER

## 2020-05-25 RX ORDER — TRIAMCINOLONE ACETONIDE 1 MG/G
OINTMENT TOPICAL 2 TIMES DAILY
Qty: 453.6 G | Refills: 0 | Status: SHIPPED | OUTPATIENT
Start: 2020-05-25 | End: 2021-05-07

## 2020-05-25 RX ORDER — HYDROXYZINE HYDROCHLORIDE 10 MG/1
10 TABLET, FILM COATED ORAL 3 TIMES DAILY PRN
Qty: 15 TABLET | Refills: 0 | Status: SHIPPED | OUTPATIENT
Start: 2020-05-25 | End: 2020-05-30

## 2020-05-25 RX ORDER — FEXOFENADINE HCL 180 MG/1
180 TABLET ORAL DAILY
Qty: 14 TABLET | Refills: 0 | Status: SHIPPED | OUTPATIENT
Start: 2020-05-25 | End: 2020-06-08

## 2020-05-25 ASSESSMENT — PAIN SCALES - GENERAL: PAINLEVEL: NO PAIN (0)

## 2020-05-25 NOTE — PATIENT INSTRUCTIONS
"  Patient Education     Contact Dermatitis  Contact dermatitis is a skin rash caused by something that touches the skin and makes it irritated and inflamed. Your skin may be red, swollen, dry, and may be cracked. Blisters may form and ooze. The rash will itch.  Contact dermatitis can form on the face and neck, backs of hands, forearms, genitals, and lower legs.  People can get contact dermatitis from lots of sources. These include:    Plants such as poison ivy, oak, or sumac    Chemicals in hair dyes and rinses, soaps, solvents, waxes, fingernail polish, and deodorants     Jewelry or watchbands made of nickel  Contact dermatitis is not passed from person to person.  Talk with your healthcare provider about what may have caused the rash. A type of allergy testing called \"patch testing\" may be used to discover what you are allergic to. You will need to avoid the source of your rash in the future to prevent it from coming back.  Treatment is done to relieve itching and prevent the rash from coming back. The rash should go away in a few days to a few weeks.  Home care  Your healthcare provider may prescribe medicine to relieve swelling and itching. Follow all instructions when using these medicines.  General care:    Avoid anything that heats up your skin, such as hot showers or baths, or direct sunlight. This can make itching worse.    Apply cold compresses to soothe your sores to help relieve your symptoms. Do this for 30 minutes 3 to 4 times a day. You can make a cold compress by soaking a cloth in cold water. Squeeze out excess water. You can add colloidal oatmeal to the water to help reduce itching. For severe itching in a small area, apply an ice pack wrapped in a thin towel. Do this for 20 minutes 3 to 4 times a day.    You can also try wet dressings. One way to do this is to wear a wet piece of clothing under a dry one. Wear a damp shirt under a dry shirt if your upper body is affected. This can relieve itching " and prevent you from scratching the affected area.    You can also help relieve large areas of itching by taking a lukewarm bath with colloidal oatmeal added to the water.    Use hydrocortisone cream for redness and irritation, unless another medicine was prescribed. You can also use benzocaine anesthetic cream or spray. Calamine lotion can also relieve mild symptoms.    Use oral diphenhydramine to help reduce itching. You can buy this antihistamine at drug and grocery stores. It can make you sleepy, so use lower doses during the daytime. Or you can use loratadine. This is an antihistamine that will not make you sleepy. Do not use diphenhydramine if you have glaucoma or have trouble urinating due to an enlarged prostate.    If a plant causes your rash, make sure to wash your skin and the clothes you were wearing when you came into contact with the plant. This is to wash away the plant oils that gave you the rash and prevent more or worse symptoms.    Stay away from the substance or object that causes your symptoms. If you can t avoid it, wear gloves or some other type of protection.  Follow-up care  Follow up with your healthcare provider, or as advised.  When to seek medical advice  Call your healthcare provider right away if any of these occur:    Spreading of the rash to other parts of your body    Severe swelling of your face, eyelids, mouth, throat or tongue    Trouble urinating due to swelling in the genital area    Fever of 100.4 F (38 C) or higher    Redness or swelling that gets worse    Pain that gets worse    Foul-smelling fluid leaking from the skin    Yellow-brown crusts on the open blisters  Date Last Reviewed: 9/1/2016 2000-2019 [employer]. [address] [place] [address], [place] [address]. All rights reserved. This information is not intended as a substitute for professional medical care. Always follow your healthcare professional's instructions.

## 2020-05-25 NOTE — PROGRESS NOTES
SUBJECTIVE:   Jackelin Sal is a 24 year old female presenting with a chief complaint of   Chief Complaint   Patient presents with     Rash     all over body-started out on the right abdomen, noticed it on Wed and by Fri. it all over, did started new a multivitamin on Sat. the 9th and noticed the rash on the 20th        She is an established patient of [facility].    Rash    Onset of rash was 3 days ago.   Course of illness is sudden onset and worsening.  Severity mild to moderate, now widespread  Current and Associated symptoms: itching and red   Location of the rash: Bilateral upper and lower extremities, buttocks, anterior/posterior torso.  Previous history of a similar rash? No  Recent exposure history: medications, new gummy multivitamin   Denies exposure to: dietary change, environmental allergens, new household products and new skincare products  Associated symptoms include: nothing.  Treatment measures tried include: otc hydrocortisone cream and benadryl OTC.   Denies any lung or asthma concerns.   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       Past Medical History:   Diagnosis Date     Concussion 2010    x2     Family History   Problem Relation Age of Onset     Hypertension Maternal Grandfather      Deep Vein Thrombosis Maternal Grandfather 40     Hypertension Paternal Grandfather      Heart Disease Father         wpw     Anxiety Disorder Father      Depression Mother      Anxiety Disorder Mother      Peripheral Vascular Disease Maternal Grandmother      Thyroid Disease Maternal Grandmother      Hyperlipidemia Paternal Grandmother      Deep Vein Thrombosis Maternal Aunt 40        has genetic mutation for clotting     C.A.D. No family hx of      Breast Cancer No family hx of      Current Outpatient Medications   Medication Sig Dispense Refill     fexofenadine (ALLEGRA) 180 MG tablet Take 1  tablet (180 mg) by mouth daily for 14 days 14 tablet 0     hydrOXYzine (ATARAX) 10 MG tablet Take 1 tablet (10 mg) by mouth 3 times daily as needed for itching 15 tablet 0     norethindrone-ethinyl estradiol (MICROGESTIN 1.5/30) 1.5-30 MG-MCG tablet Take 1 tablet by mouth daily 84 tablet 11     triamcinolone (KENALOG) 0.1 % external ointment Apply topically 2 times daily 453.6 g 0     NO ACTIVE MEDICATIONS        Social History     Tobacco Use     Smoking status: Never Smoker     Smokeless tobacco: Never Used   Substance Use Topics     Alcohol use: Yes       OBJECTIVE  /74 (BP Location: Left arm, Patient Position: Sitting, Cuff Size: Adult Regular)   Pulse 101   Temp 99.1  F (37.3  C) (Tympanic)   Resp 20   Wt 72.4 kg (159 lb 9.6 oz)   SpO2 99%   BMI 27.86 kg/m      Physical Exam  Constitutional:       Appearance: Normal appearance. She is not ill-appearing.   Neck:      Musculoskeletal: Neck supple.   Cardiovascular:      Rate and Rhythm: Normal rate and regular rhythm.      Pulses: Normal pulses.      Heart sounds: Normal heart sounds.   Pulmonary:      Effort: Pulmonary effort is normal.      Breath sounds: Normal breath sounds.   Lymphadenopathy:      Cervical: No cervical adenopathy.   Skin:     General: Skin is warm.      Capillary Refill: Capillary refill takes less than 2 seconds.      Findings: Erythema and rash present.      Comments: Generalized fine macular rash throughout anterior/posterior torso, upper and lower extremities and buttocks area. No warmth or fluctuance.    Neurological:      General: No focal deficit present.      Mental Status: She is alert and oriented to person, place, and time. Mental status is at baseline.   Psychiatric:         Mood and Affect: Mood normal.         Behavior: Behavior normal.         Labs:  No results found for this or any previous visit (from the past 24 hour(s)).      ASSESSMENT:    ICD-10-CM    1. Dermatitis due to drug reaction  L27.0 triamcinolone  "(KENALOG) 0.1 % external ointment     fexofenadine (ALLEGRA) 180 MG tablet     hydrOXYzine (ATARAX) 10 MG tablet        Medical Decision Making:    Differential Diagnosis:  Rash: Contact dermatitis  Dermatitis  Drug eruption    Serious Comorbid Conditions:  Adult:  None    PLAN: Discussed with patient at length regarding skin cares with drug/contact related dermatitis rash.  Discussed topical ointment, allegra and intermittent use of atarax as needed. Monitor for superimposed infection and return if rash worsens or spreads as discussed. Reviewed role of a burst of steroids, however due to current COVID-19 pandemic and risk as patient's  is a , will defer at this time. Patient advised that if symptoms do not improve with current regimen, return for reevaluation and we may need to reconsider steroid burst at that time. Education provided. Patient agreed to the plan of care with no further questions or concerns.     MAGUE Bullock, CNP      Patient Instructions     Patient Education     Contact Dermatitis  Contact dermatitis is a skin rash caused by something that touches the skin and makes it irritated and inflamed. Your skin may be red, swollen, dry, and may be cracked. Blisters may form and ooze. The rash will itch.  Contact dermatitis can form on the face and neck, backs of hands, forearms, genitals, and lower legs.  People can get contact dermatitis from lots of sources. These include:    Plants such as poison ivy, oak, or sumac    Chemicals in hair dyes and rinses, soaps, solvents, waxes, fingernail polish, and deodorants     Jewelry or watchbands made of nickel  Contact dermatitis is not passed from person to person.  Talk with your healthcare provider about what may have caused the rash. A type of allergy testing called \"patch testing\" may be used to discover what you are allergic to. You will need to avoid the source of your rash in the future to prevent it from coming " back.  Treatment is done to relieve itching and prevent the rash from coming back. The rash should go away in a few days to a few weeks.  Home care  Your healthcare provider may prescribe medicine to relieve swelling and itching. Follow all instructions when using these medicines.  General care:    Avoid anything that heats up your skin, such as hot showers or baths, or direct sunlight. This can make itching worse.    Apply cold compresses to soothe your sores to help relieve your symptoms. Do this for 30 minutes 3 to 4 times a day. You can make a cold compress by soaking a cloth in cold water. Squeeze out excess water. You can add colloidal oatmeal to the water to help reduce itching. For severe itching in a small area, apply an ice pack wrapped in a thin towel. Do this for 20 minutes 3 to 4 times a day.    You can also try wet dressings. One way to do this is to wear a wet piece of clothing under a dry one. Wear a damp shirt under a dry shirt if your upper body is affected. This can relieve itching and prevent you from scratching the affected area.    You can also help relieve large areas of itching by taking a lukewarm bath with colloidal oatmeal added to the water.    Use hydrocortisone cream for redness and irritation, unless another medicine was prescribed. You can also use benzocaine anesthetic cream or spray. Calamine lotion can also relieve mild symptoms.    Use oral diphenhydramine to help reduce itching. You can buy this antihistamine at drug and grocery stores. It can make you sleepy, so use lower doses during the daytime. Or you can use loratadine. This is an antihistamine that will not make you sleepy. Do not use diphenhydramine if you have glaucoma or have trouble urinating due to an enlarged prostate.    If a plant causes your rash, make sure to wash your skin and the clothes you were wearing when you came into contact with the plant. This is to wash away the plant oils that gave you the rash and  prevent more or worse symptoms.    Stay away from the substance or object that causes your symptoms. If you can t avoid it, wear gloves or some other type of protection.  Follow-up care  Follow up with your healthcare provider, or as advised.  When to seek medical advice  Call your healthcare provider right away if any of these occur:    Spreading of the rash to other parts of your body    Severe swelling of your face, eyelids, mouth, throat or tongue    Trouble urinating due to swelling in the genital area    Fever of 100.4 F (38 C) or higher    Redness or swelling that gets worse    Pain that gets worse    Foul-smelling fluid leaking from the skin    Yellow-brown crusts on the open blisters  Date Last Reviewed: 9/1/2016 2000-2019 [employer]. [address] [place] [address], [place] [address]. All rights reserved. This information is not intended as a substitute for professional medical care. Always follow your healthcare professional's instructions.

## 2020-07-12 ENCOUNTER — VIRTUAL VISIT (OUTPATIENT)
Dept: FAMILY MEDICINE | Facility: OTHER | Age: 25
End: 2020-07-12
Payer: COMMERCIAL

## 2020-07-12 PROCEDURE — 99421 OL DIG E/M SVC 5-10 MIN: CPT | Performed by: PHYSICIAN ASSISTANT

## 2020-07-12 NOTE — PROGRESS NOTES
"Date: 2020 09:49:43  Clinician: Natasha Amaya  Clinician NPI: [id number]  Patient: Jackelin Sal  Patient : 1995  Patient Address: [address] [place] [address]  Patient Phone: [phone]  Visit Protocol: URI  Patient Summary:  Jackelin is a 24 year old ( : 1995 ) female who initiated a Visit for cold, sinus infection, or influenza. When asked the question \"Please sign me up to receive news, health information and promotions from [employer].\", Jackelin responded \"No\".    Jackelin states her symptoms started 1-2 days ago.   Her symptoms consist of tooth pain, rhinitis, malaise, a sore throat, facial pain or pressure, a cough, and nasal congestion. She is experiencing difficulty breathing due to nasal congestion but she is not short of breath.   Symptom details     Nasal secretions: The color of her mucus is yellow and clear.    Cough: Jackelin coughs every 5-10 minutes and her cough is more bothersome at night. Phlegm comes into her throat when she coughs. She believes her cough is caused by post-nasal drip. The color of the phlegm is yellow.     Sore throat: Jackelin reports having moderate throat pain (4-6 on a 10 point pain scale), does not have exudate on her tonsils, and can swallow liquids. She is not sure if the lymph nodes in her neck are enlarged. A rash has not appeared on the skin since the sore throat started.     Facial pain or pressure: The facial pain or pressure feels worse when bending over or leaning forward.     Tooth pain: The tooth pain is not caused by a cavity, recent dental work, or other mouth problems.      Jackelin denies having wheezing, nausea, ageusia, diarrhea, vomiting, ear pain, headache, chills, myalgias, anosmia, and fever. She also denies having recent facial or sinus surgery in the past 60 days, taking antibiotic medication in the past month, and having a sinus infection within the past year.   Precipitating events  Within the past week, Jackelin has " not been exposed to someone with strep throat. She has not recently been exposed to someone with influenza. Jackelin has not been in close contact with any high risk individuals.   Pertinent COVID-19 (Coronavirus) information  In the past 14 days, Jackelin has not worked in a congregate living setting.   She does not work or volunteer as healthcare worker or a  and does not work or volunteer in a healthcare facility.   Jackelin also has not lived in a congregate living setting in the past 14 days. She does not live with a healthcare worker.   Jackelin has not had a close contact with a laboratory-confirmed COVID-19 patient within 14 days of symptom onset.   Pertinent medical history  Jackelin does not get yeast infections when she takes antibiotics.   Jackelin does not need a return to work/school note.   Weight: 160 lbs   Jackelin does not smoke or use smokeless tobacco.   She denies pregnancy and denies breastfeeding. Her last period was over a month ago.   Weight: 160 lbs    MEDICATIONS: Lana Fe 1.5/30 (28) oral, ALLERGIES: NKDA  Clinician Response:  Dear Jackelin,  Based on the information provided, you have viral sinusitis, also known as a sinus infection. Sinus infections are caused by bacteria or a virus and symptoms are almost always identical. The difference between the 2 types of infections is timing.  Sinus infections start as viral infections and symptoms improve on their own in about 7 days. If symptoms have not improved after 7 days or have even worsened, a bacterial infection may have developed.  Medication information  Because you have a viral infection, antibiotics will not help you get better. Treating a viral infection with antibiotics could actually make you feel worse.  I am prescribing:     Fluticasone 50 mcg/actuation nasal spray. Inhale 2 sprays in each nostril 1 time per day; after 1 week, may adjust to 1 - 2 sprays in each nostril 1 time per day. This medication takes  several days to start working, so keep taking it even if it doesn't help right away. There are no refills with this prescription.   Unless you are allergic to the over-the-counter medication(s) below, I recommend using:       Ibuprofen (Advil or store brand) 200 mg oral tablet. Take 1-3 tablets (200-600 mg) by mouth every 8 hours to help with the discomfort. Make sure to take the ibuprofen with food. Do not exceed 2400 mg in 24 hours.    A decongestant such as Sudafed PE or store brand.     Over-the-counter medications do not require a prescription. Ask the pharmacist if you have any questions.  Self care  Steps you can take to be as comfortable as possible:     Rest.    Drink plenty of fluids.    Take a warm shower to loosen congestion    Use a cool-mist humidifier.    Use throat lozenges.    Suck on frozen items such as popsicles.    Drink hot tea with lemon and honey.    Gargle with warm salt water (1/4 teaspoon of salt per 8 ounce glass of water).    Take a spoonful of honey to reduce your cough.     When to seek care  Please be seen in a clinic or urgent care if any of the following occur:   New symptoms develop, or symptoms become worse   Call ahead before going to the clinic or urgent care.  Call 911 or go to the emergency room if you feel that your throat is closing off, you suddenly develop a rash, you are unable to swallow fluids, you are drooling, or you are having difficulty breathing.  COVID-19 (Coronavirus) General Information  Because there is currently no vaccine to prevent infection, the best way to protect yourself is to avoid being exposed to this virus. Common symptoms of COVID-19 include but are not limited to fever, cough, and shortness of breath. These symptoms appear 2-14 days after you are exposed to the virus that causes COVID-19. Click here for more information from the CDC on how to protect yourself.  If you are sick with COVID-19 or suspect you are infected with the virus that causes  COVID-19, follow the steps here from the CDC to help prevent the disease from spreading to people in your home and community.  Click here for general information from the CDC on testing.  If you develop any of these emergency warning signs for COVID-19, get medical attention immediately:     Trouble breathing    Persistent pain or pressure in the chest    New confusion or inability to arouse    Bluish lips or face      Call your doctor or clinic before going in. Call 911 if you have a medical emergency and notify the  you have or think you may have COVID-19.  For more detailed and up to date information on COVID-19 (Coronavirus), please visit the CDC website.   Diagnosis: Viral sinusitis  Diagnosis ICD: J01.90  Prescription: fluticasone 50 mcg/actuation nasal spray,suspension 1 120 spray aerosol with adapter (grams), 30 days supply. Inhale 2 sprays in each nostril 1 time per day; after 1 week, may adjust to 1 - 2 sprays in each nostril 1 time per day.. Refills: 0, Refill as needed: no, Allow substitutions: yes  Pharmacy: [facility] - [phone] - [address] [facility] [place] [address]

## 2020-08-13 NOTE — PROGRESS NOTES
Clinic Note    CC:    Chief Complaint   Patient presents with     Physical      HPI:  Jackelin Sal is a 24 year old  who presents for annual exam. No symptoms nor concerns about her body. She would like to discuss desired fertility this fall. Plan to stop the birth control next month.   She is , male partner, feels safe. She is a . Works out most days of the week. She has PCOS, struggles with weight. She has been on OCPs or Depo provera since age 16, periods were very irregular prior to this. She continues to take OCPs, intermittent extended cycle.   No family hx of birth defects on either side.     Ob Hx:   Gyn Hx: No LMP recorded. (Menstrual status: Birth Control).      Last pap NIL ()   Using OCPs for birth control  PMH:   Past Medical History:   Diagnosis Date     Concussion 2010    x2     PCOS (polycystic ovarian syndrome)      SurgHx:   Past Surgical History:   Procedure Laterality Date     wisedome teeth       FamHx:   Family History   Problem Relation Age of Onset     Hypertension Maternal Grandfather      Deep Vein Thrombosis Maternal Grandfather 40     Hypertension Paternal Grandfather      Heart Disease Father         wpw     Anxiety Disorder Father      Depression Mother      Anxiety Disorder Mother      Peripheral Vascular Disease Maternal Grandmother      Thyroid Disease Maternal Grandmother      Hyperlipidemia Paternal Grandmother      Deep Vein Thrombosis Maternal Aunt 40        has genetic mutation for clotting     C.A.D. No family hx of      Breast Cancer No family hx of      SocHx:   Social History     Socioeconomic History     Marital status:      Spouse name: Not on file     Number of children: Not on file     Years of education: Not on file     Highest education level: Not on file   Occupational History     Not on file   Social Needs     Financial resource strain: Not on file     Food insecurity     Worry: Not on file     Inability: Not  "on file     Transportation needs     Medical: Not on file     Non-medical: Not on file   Tobacco Use     Smoking status: Never Smoker     Smokeless tobacco: Never Used   Substance and Sexual Activity     Alcohol use: Yes     Drug use: No     Sexual activity: Yes     Partners: Male     Birth control/protection: Condom, Pill   Lifestyle     Physical activity     Days per week: Not on file     Minutes per session: Not on file     Stress: Not on file   Relationships     Social connections     Talks on phone: Not on file     Gets together: Not on file     Attends Rastafarian service: Not on file     Active member of club or organization: Not on file     Attends meetings of clubs or organizations: Not on file     Relationship status: Not on file     Intimate partner violence     Fear of current or ex partner: Not on file     Emotionally abused: Not on file     Physically abused: Not on file     Forced sexual activity: Not on file   Other Topics Concern     Parent/sibling w/ CABG, MI or angioplasty before 65F 55M? No   Social History Narrative     Not on file     Allergies:   Patient has no known allergies.  Current Medications:   Current Outpatient Medications   Medication Sig Dispense Refill     NO ACTIVE MEDICATIONS        norethindrone-ethinyl estradiol (MICROGESTIN 1.5/30) 1.5-30 MG-MCG tablet Take 1 tablet by mouth daily 84 tablet 11     triamcinolone (KENALOG) 0.1 % external ointment Apply topically 2 times daily 453.6 g 0     ROS: 10 point ROS negative other than as noted in the HPI    EXAM:  Vitals:    08/14/20 0942   BP: 120/70   BP Location: Right arm   Patient Position: Chair   Cuff Size: Adult Regular   Pulse: 87   Temp: 99.3  F (37.4  C)   TempSrc: Temporal   Weight: 75.5 kg (166 lb 8 oz)   Height: 1.632 m (5' 4.25\")    There is no height or weight on file to calculate BMI.    Gen: Alert, oriented, appropriately interactive, NAD  CV: RRR, no murmurs, no extra heart sounds, 2+ peripheral pulses  Resp: CTAB, good " effort without distress   Breasts: no axillary adenopathy, no dominant masses, no skin changes, no nipple discharge, nontender  Abdomen: soft, non tender, non distended, no masses, no hernias. No inguinal lymphadenopathy.   Perineum: no lesions; normal appearing external genitalia, bartholins glands, urethra, skenes glands  Vagina: no masses or lesions or discharge, normally rugated.  Cervix: no masses or lesions or discharge   Anus: appears normal  Bimanual exam:   Nontender pelvic floor muscles  Uterus: midline, anteverted, small, mobile  no masses, non-tender  Adnexa: no masses or tenderness appreciated   No cervical motion tenderness  MSK: normal gait, symmetric movements UE & LE  Lower extremities: non-tender, no edema    Labs & Imaging:  Pelvic Ultrasound (): Normal uterus [id number], 1mm EMS, polycystic ovaries     Labs from : FSH 5.2, TSH 0.90, Prolactin 7, T total 43, T free 0.48    Lab Results   Component Value Date    PAP NIL 2019    PAP NIL 10/03/2016       ASSESSMENT/PLAN: Jackelin Sal is a 24 year old  woman who presents for annual exam and preconception counseling.     Annual exam  - Normal exam today and no symptomatic concerns  - other issues as below     PCOS (polycystic ovarian syndrome)  - Reviewed that recommendations for metformin use in the setting of desired fertility in women with PCOS has fluctuated, evidence is weak.   - metFORMIN (GLUCOPHAGE-XR) 500 MG 24 hr tablet; Take 2 tablets (1,000 mg) by mouth daily (with dinner)  Dispense: 60 tablet; Refill: 4    2. Encounter for preconception consultation  - No concerning family hx on either side  - No concerning environmental exposures   - Partner is an EMS, discussed safety regarding Covid19  - Reviewed recommendations for avoiding EtOH and other drugs while attempting fertility   - metFORMIN (GLUCOPHAGE-XR) 500 MG 24 hr tablet; Take 2 tablets (1,000 mg) by mouth daily (with dinner)  Dispense: 60 tablet; Refill: 4  -  Prenatal Vit-Fe Fumarate-FA (PRENATAL MULTIVITAMIN W/IRON) 27-0.8 MG tablet; Take 1 tablet by mouth daily  Dispense: 90 tablet; Refill: 7    3. Screen for STD (sexually transmitted disease)  - normal pelvic exam today  - Neisseria gonorrhoeae PCR  - Chlamydia trachomatis PCR    Nasim Santana MD   8/13/2020 6:29 PM

## 2020-08-14 ENCOUNTER — OFFICE VISIT (OUTPATIENT)
Dept: OBGYN | Facility: CLINIC | Age: 25
End: 2020-08-14
Payer: COMMERCIAL

## 2020-08-14 VITALS
WEIGHT: 166.5 LBS | HEART RATE: 87 BPM | HEIGHT: 64 IN | TEMPERATURE: 99.3 F | DIASTOLIC BLOOD PRESSURE: 70 MMHG | BODY MASS INDEX: 28.42 KG/M2 | SYSTOLIC BLOOD PRESSURE: 120 MMHG

## 2020-08-14 DIAGNOSIS — Z11.3 SCREEN FOR STD (SEXUALLY TRANSMITTED DISEASE): ICD-10-CM

## 2020-08-14 DIAGNOSIS — Z31.69 ENCOUNTER FOR PRECONCEPTION CONSULTATION: ICD-10-CM

## 2020-08-14 DIAGNOSIS — Z00.00 ANNUAL PHYSICAL EXAM: Primary | ICD-10-CM

## 2020-08-14 DIAGNOSIS — E28.2 PCOS (POLYCYSTIC OVARIAN SYNDROME): ICD-10-CM

## 2020-08-14 PROCEDURE — 87491 CHLMYD TRACH DNA AMP PROBE: CPT | Performed by: OBSTETRICS & GYNECOLOGY

## 2020-08-14 PROCEDURE — 99395 PREV VISIT EST AGE 18-39: CPT | Performed by: OBSTETRICS & GYNECOLOGY

## 2020-08-14 PROCEDURE — 87591 N.GONORRHOEAE DNA AMP PROB: CPT | Performed by: OBSTETRICS & GYNECOLOGY

## 2020-08-14 RX ORDER — METFORMIN HCL 500 MG
1000 TABLET, EXTENDED RELEASE 24 HR ORAL
Qty: 60 TABLET | Refills: 4 | Status: SHIPPED | OUTPATIENT
Start: 2020-08-14 | End: 2022-05-02

## 2020-08-14 RX ORDER — PRENATAL VIT/IRON FUM/FOLIC AC 27MG-0.8MG
1 TABLET ORAL DAILY
Qty: 90 TABLET | Refills: 7 | Status: SHIPPED | OUTPATIENT
Start: 2020-08-14 | End: 2022-08-04

## 2020-08-14 SDOH — HEALTH STABILITY: MENTAL HEALTH: HOW OFTEN DO YOU HAVE A DRINK CONTAINING ALCOHOL?: MONTHLY OR LESS

## 2020-08-14 ASSESSMENT — MIFFLIN-ST. JEOR: SCORE: 1494.24

## 2020-08-14 ASSESSMENT — PATIENT HEALTH QUESTIONNAIRE - PHQ9: SUM OF ALL RESPONSES TO PHQ QUESTIONS 1-9: 6

## 2020-08-14 NOTE — NURSING NOTE
"Chief Complaint   Patient presents with     Physical       Initial /70 (BP Location: Right arm, Patient Position: Chair, Cuff Size: Adult Regular)   Pulse 87   Temp 99.3  F (37.4  C) (Temporal)   Ht 1.632 m (5' 4.25\")   Wt 75.5 kg (166 lb 8 oz)   LMP 2020 (Within Weeks)   BMI 28.36 kg/m   Estimated body mass index is 28.36 kg/m  as calculated from the following:    Height as of this encounter: 1.632 m (5' 4.25\").    Weight as of this encounter: 75.5 kg (166 lb 8 oz).  BP completed using cuff size: regular        The following  Due: Cherie ()      The following patient reported/Care Every where data was sent to:  P ABSTRACT QUALITY INITIATIVES [[id number]]       Ivette Gonzalez, [facility]  2020           "

## 2020-10-30 DIAGNOSIS — E28.2 PCOS (POLYCYSTIC OVARIAN SYNDROME): Primary | ICD-10-CM

## 2020-11-03 ENCOUNTER — TELEPHONE (OUTPATIENT)
Dept: NUTRITION | Facility: CLINIC | Age: 25
End: 2020-11-03

## 2020-11-03 NOTE — PROGRESS NOTES
Nutrition Note Brief: Telephone Encounter    RD received a nutrition referral for patient on 10.30.2020 from Nasim Santana in regards to PCOS with nutritional instruct- wt loss. Called Pt today to schedule an appointment. This is the first attempt at contacting patient to set up nutrition appointment.     Pt did not answer phone call. Left voicemail.    Fifi Ralph MBA, RD LD  Clinical Dietitian  [facility] office [phone]  on-call weekend pager [phone]

## 2020-11-13 ENCOUNTER — HOSPITAL ENCOUNTER (OUTPATIENT)
Dept: NUTRITION | Facility: CLINIC | Age: 25
Discharge: HOME | End: 2020-11-13
Admitting: OBSTETRICS & GYNECOLOGY
Payer: COMMERCIAL

## 2020-11-13 PROCEDURE — 97802 MEDICAL NUTRITION INDIV IN: CPT | Mod: 95

## 2020-11-13 NOTE — PROGRESS NOTES
"[facility]  Medical Nutrition Therapy    Visit Type: Initial Assessment    Jackelin Sal referred by Nasim Santana MD for MNT related to PCOS with nutritional instruct in wt loss    Jackelin Sal is a 24 year old female who is being evaluated via a billable telephone visit.      The patient has been notified of following:     \"This telephone visit will be conducted via a call between you and your physician/provider. We have found that certain health care needs can be provided without the need for a physical exam.  This service lets us provide the care you need with a short phone conversation.     Telephone visits are billed at different rates depending on your insurance coverage. During this emergency period, for some insurers they may be billed the same as an in-person visit.  Please reach out to your insurance provider with any questions.    If during the course of the call the physician/provider feels a telephone visit is not appropriate, you will not be charged for this service.\"    Patient has given verbal consent for Telephone visit?  Yes    Phone call duration: 60 minutes    -originally video visit with technical difficulties     Nutrition Assessment:  Anthropometrics  Height: 5' 4\"     BMI:  28.36 kg/m^2       Weight: 166 lbs (75.5 kg)     Goal to lose wt versus current trend of gaining about 5 lbs every 4/5 months for the psat 1.5 years      IBW: 120 lbs (54.5 kg)   IBW%: 138      Weight History:   Wt Readings from Last 8 Encounters:   08/14/20 75.5 kg (166 lb 8 oz)   05/25/20 72.4 kg (159 lb 9.6 oz)   09/23/19 74.4 kg (164 lb)   11/21/18 70.4 kg (155 lb 3.2 oz)   09/11/17 69.4 kg (153 lb)   10/03/16 66.2 kg (146 lb)   10/12/15 64.4 kg (142 lb) (72 %, Z= 0.57)*   08/11/15 62.6 kg (138 lb) (67 %, Z= 0.44)*     * Growth percentiles are based on CDC (Girls, 2-20 Years) data.         Nutrition History    PMH:   Patient Active Problem List   Diagnosis     Family history of cardiac " arrhythmia     Primary exertional headache     Dysmenorrhea     CARDIOVASCULAR SCREENING; LDL GOAL LESS THAN 160   -concerns of stopping birth control related to past trials that created weight gain and acne  -PCOS  -would like to get healthy prior to stopping birth control and attempting conception       Labs: reviewed  Hemoglobin A1C (%)   Date Value   09/23/2019 4.8         Meds: reviewed  Current Outpatient Medications   Medication     metFORMIN (GLUCOPHAGE-XR) 500 MG 24 hr tablet     norethindrone-ethinyl estradiol (MICROGESTIN 1.5/30) 1.5-30 MG-MCG tablet     Prenatal Vit-Fe Fumarate-FA (PRENATAL MULTIVITAMIN W/IRON) 27-0.8 MG tablet     -not taking metformin or prenatal yet as currently they are waiting until spring to start trying to conceive       Social/Environmental:   -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 Doesn't feel restful    Food Record:   -bfast (6)- on way to work, coffee with unsweetened almond milk and a piece of toast  -am snack- banana  -lunch- left overs or a salad bag  -dinner- veggies and protein assortments with some type of carb as well. Tacos, noodles, mashed potatoes for carb examples   -sometimes a pm snack of granola bar or an evening snack of popcorn or muffin    Beverages: water mostly, sometimes an afternoon diet coke    Nutritional Details:   -GI- constipation all of life. No BM for days then bloated and sometimes requiring pepto bismol for BM (2-3/day) come out firm and sometimes painful   -tries not to keep junk in the house cause they will eat it  -dislikes shellfish  - mild lactose intolerance   -pt cooks most meals and shops at [employer]  -pt reports being a fast eater and emotional eater (craves more hungry when stressed)  -tries to stay busy and distract so less hunger noticed when stressed      Physical Activity:  -works out about 3 days a week and recently trying more strength training as there was a lot of cardio focus before. Has done yoga in past and enjoys         ASSESSED MALNUTRITION STATUS  % Weight Loss:  None noted  % Intake:  No decreased intake noted  Subcutaneous Fat Loss:  None observed  Loss of Muscle Mass:  None observed  Fluid Retention:  None noted    Malnutrition Diagnosis:  Patient does not meet two of the above criteria necessary for diagnosing malnutrition      Nutrition Diagnosis:  Food and nutrition-related knowledge deficit related to PCOS nutrition therapy as evidenced by pt request for appointment, nutrition recall     Patient Engagement:     IMPLEMENTATION     Teaching Method(s) used: Booklet / Handout  Explanation    Diet Education:   Nutrition Education (Content):   a)  Discussed Macronutrients, myPlate portioning, Mediterranean diet, High Fiber foods, Healthy fats/omega 3s, blood sugar curve, 1200 kcal meal plan, snack list- perfect pairing (protein + complex carb), wt loss strategies, self care/stress reduction techniques, anti-inflammatory foods    b)  Provided: mediterranean nutrition therapy, high fiber food list, snack list, omega 3 list, wt loss strategies, 1200 kcal visual/myPlate portioning guide  Nutrition Education (Application):   a)  Discussed current eating plans / recommended alternative food choices   b)  Patient verbalizes understanding of diet by agreeing to make food swaps, rework portion sizes, make sure there is balance in macronutrients during eating episodes  Anticipate good compliance     -Educated pt on using MyPlate for meal planning and discussed portion sizes   -Discussed recommended and not recommended foods (choosing WGs, lean meats, low-fat dairy, fresh fruits & veggies, unsat fats, and limiting high-sodium and refined sugar foods)  -Educated pt on metabolism and used the fire analogy; talked about the importance of consuming consistent  meals/snacks throughout the day and listening to hunger/fullness cues (handout provided)  -Discussed healthy snack options- protein+complex CHO  -Educated pt on reading food labels- dietary fiber   -Encouraged pt to drink continue water throughout the day and explained the importance of hydration.   -Encouraged pt to increase daily PA- include cardiovascular activities w/ultimate goal of 60 min per day      Nutrition Intervention:   Nutrition Prescription: stabilize BS curve, appropriate kcal and macronutrient balance      Nutrition Prescription  Energy: 5199-6267 kcal       Protein: ~20% from variety       Fluid: adequate for pale urine      Fat: unsaturated varieties       Carbohydrate: complex and fiber containing      Fiber: 25g/day               Micronutrient:  -anti-inflammatory anthocyanin F/V and tumeric           Nutrition Goals:  1) Food Swaps to include whole grain products in place of simple carb grains  2) Eat protein at every meal and snack  3) Increase amount and expand type of exercise: >150 minutes aerobic and include yoga    Nutrition Follow Up / Monitoring:  Food intake, weight, physical activity, sleep     Nutrition Recommendations:  Patient to follow-up with RD in 8 weeks or when transitioning.  Patient has RD contact information to call/email if needed.      Start time: 1404  End time: 1500        Fifi Ralph MBA, RD LD  Clinical Dietitian  [facility] office [phone]  on-call weekend pager [phone]

## 2020-12-06 ENCOUNTER — HEALTH MAINTENANCE LETTER (OUTPATIENT)
Age: 25
End: 2020-12-06

## 2021-04-22 ENCOUNTER — TELEPHONE (OUTPATIENT)
Dept: FAMILY MEDICINE | Facility: CLINIC | Age: 26
End: 2021-04-22

## 2021-04-22 NOTE — TELEPHONE ENCOUNTER
Patient is scheduled for annual on 4/26/2021,  not due until 8/2021. LMTC-please see if she would like to reschedule.  Or change appointment to establish care.   Joann Brock MA on 4/22/2021 at 1:52 PM

## 2021-04-23 NOTE — TELEPHONE ENCOUNTER
I left additional message for patient to call back, please clarify if she wants to leave as physical or change to establish care visit.   Joann Brock MA on 4/23/2021 at 7:54 AM     VM left asking patient's mother to call back.  Ally Sutherland RN

## 2021-05-03 NOTE — PROGRESS NOTES
"    Assessment & Plan     Encounter to establish care  Patient was previously seeing OB/GYN.  Establishing here today    Weight gain  Patient with weight gain, dry skin, constipation, fatigue and strong family history.  Will check thyroid labs to include TPO antibody.  She does have PCOS and carbohydrates/ sugars could also be contributing to weight and this was discussed.    - TSH with free T4 reflex    Dry skin  As above  - TSH with free T4 reflex    Other fatigue  As above  - TSH with free T4 reflex  - Vitamin D Deficiency    Family history of hypothyroidism  As above  - TSH with free T4 reflex  - Thyroid peroxidase antibody  - Vitamin D Deficiency    Lipid screening  screen  - Lipid panel reflex to direct LDL Fasting    Encounter for screening examination for impaired glucose regulation and diabetes mellitus  screen  - Glucose    Need for Tdap vaccination  Update today  - TDAP VACCINE (Adacel, Boostrix)  [[id number]]    Screening for HIV (human immunodeficiency virus)  screen  - HIV Antigen Antibody Combo    Need for hepatitis C screening test  screen  - Hepatitis C antibody           BMI:   Estimated body mass index is 31.68 kg/m  as calculated from the following:    Height as of 8/14/20: 1.632 m (5' 4.25\").    Weight as of this encounter: 84.4 kg (186 lb).   Weight management plan: Discussed healthy diet and exercise guidelines        Return in about 6 months (around 11/7/2021).    Nanette Coronado NP  [facility] is a 25 year old who presents for the following health issues     History of Present Illness       She eats 4 or more servings of fruits and vegetables daily.She consumes 1 sweetened beverage(s) daily.She exercises with enough effort to increase her heart rate 20 to 29 minutes per day.  She exercises with enough effort to increase her heart rate 3 or less days per week.   She is taking medications regularly.       New Patient/Transfer of Care  Would like to " discuss thyroid, fatigue and weight gain Vitamin D.    Maternal grandmother with hyperthyroidism, materanal gma and aunt have hypothyroidism.  No sisters. Last checked in 2018.  Has dry skin.  No hair loss.  Is always either too cold or too heart.      They are not preventing but they are not trying.      Review of Systems   Constitutional, HEENT, cardiovascular, pulmonary, gi and gu systems are negative, except as otherwise noted.      Objective    /62   Pulse 93   Temp 98.1  F (36.7  C)   Resp 10   Wt 84.4 kg (186 lb)   LMP 05/03/2021 (Exact Date)   SpO2 97%   Breastfeeding No   BMI 31.68 kg/m    Body mass index is 31.68 kg/m .  Physical Exam   GENERAL: healthy, alert and no distress  EYES: Eyes grossly normal to inspection, PERRL and conjunctivae and sclerae normal  HENT: ear canals and TM's normal, nose and mouth without ulcers or lesions  NECK: no adenopathy, no asymmetry, masses, or scars and thyroid normal to palpation  RESP: lungs clear to auscultation - no rales, rhonchi or wheezes  CV: regular rate and rhythm, normal S1 S2, no S3 or S4, no murmur, click or rub, no peripheral edema and peripheral pulses strong  ABDOMEN: soft, nontender, no hepatosplenomegaly, no masses and bowel sounds normal  MS: no gross musculoskeletal defects noted, no edema    No results found for this or any previous visit (from the past 24 hour(s)).

## 2021-05-07 ENCOUNTER — OFFICE VISIT (OUTPATIENT)
Dept: FAMILY MEDICINE | Facility: CLINIC | Age: 26
End: 2021-05-07
Payer: COMMERCIAL

## 2021-05-07 VITALS
RESPIRATION RATE: 10 BRPM | SYSTOLIC BLOOD PRESSURE: 102 MMHG | HEART RATE: 93 BPM | BODY MASS INDEX: 31.68 KG/M2 | TEMPERATURE: 98.1 F | OXYGEN SATURATION: 97 % | DIASTOLIC BLOOD PRESSURE: 62 MMHG | WEIGHT: 186 LBS

## 2021-05-07 DIAGNOSIS — Z13.1 ENCOUNTER FOR SCREENING EXAMINATION FOR IMPAIRED GLUCOSE REGULATION AND DIABETES MELLITUS: ICD-10-CM

## 2021-05-07 DIAGNOSIS — Z13.220 LIPID SCREENING: ICD-10-CM

## 2021-05-07 DIAGNOSIS — R63.5 WEIGHT GAIN: ICD-10-CM

## 2021-05-07 DIAGNOSIS — Z23 NEED FOR TDAP VACCINATION: ICD-10-CM

## 2021-05-07 DIAGNOSIS — Z76.89 ENCOUNTER TO ESTABLISH CARE: Primary | ICD-10-CM

## 2021-05-07 DIAGNOSIS — Z11.59 NEED FOR HEPATITIS C SCREENING TEST: ICD-10-CM

## 2021-05-07 DIAGNOSIS — R53.83 OTHER FATIGUE: ICD-10-CM

## 2021-05-07 DIAGNOSIS — L85.3 DRY SKIN: ICD-10-CM

## 2021-05-07 DIAGNOSIS — Z83.49 FAMILY HISTORY OF HYPOTHYROIDISM: ICD-10-CM

## 2021-05-07 DIAGNOSIS — Z11.4 SCREENING FOR HIV (HUMAN IMMUNODEFICIENCY VIRUS): ICD-10-CM

## 2021-05-07 LAB
CHOLEST SERPL-MCNC: 158 MG/DL
DEPRECATED CALCIDIOL+CALCIFEROL SERPL-MC: 21 UG/L (ref 20–75)
GLUCOSE SERPL-MCNC: 94 MG/DL (ref 70–99)
HCV AB SERPL QL IA: NONREACTIVE
HDLC SERPL-MCNC: 43 MG/DL
HIV 1+2 AB+HIV1 P24 AG SERPL QL IA: NONREACTIVE
LDLC SERPL CALC-MCNC: 94 MG/DL
NONHDLC SERPL-MCNC: 115 MG/DL
THYROPEROXIDASE AB SERPL-ACNC: <10 IU/ML
TRIGL SERPL-MCNC: 103 MG/DL
TSH SERPL DL<=0.005 MIU/L-ACNC: 1.83 MU/L (ref 0.4–4)

## 2021-05-07 PROCEDURE — 99214 OFFICE O/P EST MOD 30 MIN: CPT | Mod: 25 | Performed by: NURSE PRACTITIONER

## 2021-05-07 PROCEDURE — 90471 IMMUNIZATION ADMIN: CPT | Performed by: NURSE PRACTITIONER

## 2021-05-07 PROCEDURE — 82306 VITAMIN D 25 HYDROXY: CPT | Performed by: NURSE PRACTITIONER

## 2021-05-07 PROCEDURE — 86376 MICROSOMAL ANTIBODY EACH: CPT | Performed by: NURSE PRACTITIONER

## 2021-05-07 PROCEDURE — 80061 LIPID PANEL: CPT | Performed by: NURSE PRACTITIONER

## 2021-05-07 PROCEDURE — 86803 HEPATITIS C AB TEST: CPT | Performed by: NURSE PRACTITIONER

## 2021-05-07 PROCEDURE — 84443 ASSAY THYROID STIM HORMONE: CPT | Performed by: NURSE PRACTITIONER

## 2021-05-07 PROCEDURE — 90715 TDAP VACCINE 7 YRS/> IM: CPT | Performed by: NURSE PRACTITIONER

## 2021-05-07 PROCEDURE — 82947 ASSAY GLUCOSE BLOOD QUANT: CPT | Performed by: NURSE PRACTITIONER

## 2021-05-07 PROCEDURE — 36415 COLL VENOUS BLD VENIPUNCTURE: CPT | Performed by: NURSE PRACTITIONER

## 2021-05-07 PROCEDURE — 87389 HIV-1 AG W/HIV-1&-2 AB AG IA: CPT | Performed by: NURSE PRACTITIONER

## 2021-05-07 ASSESSMENT — ANXIETY QUESTIONNAIRES
2. NOT BEING ABLE TO STOP OR CONTROL WORRYING: SEVERAL DAYS
IF YOU CHECKED OFF ANY PROBLEMS ON THIS QUESTIONNAIRE, HOW DIFFICULT HAVE THESE PROBLEMS MADE IT FOR YOU TO DO YOUR WORK, TAKE CARE OF THINGS AT HOME, OR GET ALONG WITH OTHER PEOPLE: NOT DIFFICULT AT ALL
6. BECOMING EASILY ANNOYED OR IRRITABLE: SEVERAL DAYS
3. WORRYING TOO MUCH ABOUT DIFFERENT THINGS: SEVERAL DAYS
5. BEING SO RESTLESS THAT IT IS HARD TO SIT STILL: NOT AT ALL
1. FEELING NERVOUS, ANXIOUS, OR ON EDGE: SEVERAL DAYS
GAD7 TOTAL SCORE: 4
7. FEELING AFRAID AS IF SOMETHING AWFUL MIGHT HAPPEN: NOT AT ALL

## 2021-05-07 ASSESSMENT — PATIENT HEALTH QUESTIONNAIRE - PHQ9
5. POOR APPETITE OR OVEREATING: NOT AT ALL
SUM OF ALL RESPONSES TO PHQ QUESTIONS 1-9: 6

## 2021-05-07 ASSESSMENT — PAIN SCALES - GENERAL: PAINLEVEL: NO PAIN (0)

## 2021-05-07 NOTE — NURSING NOTE
Prior to injection, verified patient identity using patient's name and date of birth.  Due to injection administration, patient instructed to remain in clinic for 15 minutes  afterwards, and to report any adverse reaction to me immediately.    Screening Questionnaire for Pediatric Immunization     Is the child sick today?   No    Does the child have allergies to medications, food or any vaccine?   No    Has the child ever had a serious reaction to a vaccination in the past?   No    Has the child had a health problem with asthma, heart disease, lung           disease, kidney disease, diabetes, a metabolic or blood disorder?   No    If the child to be vaccinated is between the ages of 2 and 4 years, has a     healthcare provider told you that the child had wheezing or asthma in the    past 12 months?   No    Has the child, sibling or parent had a seizure, or has the child had brain, or other nervous system problems?   No    Does the child have cancer, leukemia, AIDS, or any immune system          problem?   No    Has the child taken cortisone, prednisone, other steroids, or anticancer      drugs, or had any x-ray (radiation) treatments in the past 3 months?   No    Has the child received a transfusion of blood or blood products, or been      given a medicine called immune (gamma) globulin in the past year?   No    Is the child/teen pregnant or is there a chance that she could become         pregnant during the next month?   No    Has the child received any vaccinations in the past 4 weeks?   No      Immunization questionnaire answers were all negative.      MNVFC doesn't apply on this patient    [place] self-screening form given to patient.    Per KW, injection of Adacel was given by Joann Brock MA. Patient instructed to remain in clinic for 20 minutes afterwards, and to report any adverse reaction to me immediately.    Screening performed by Joann Brock MA on 5/7/2021 at 7:37 AM.

## 2021-05-08 ASSESSMENT — ANXIETY QUESTIONNAIRES: GAD7 TOTAL SCORE: 4

## 2021-09-26 ENCOUNTER — HEALTH MAINTENANCE LETTER (OUTPATIENT)
Age: 26
End: 2021-09-26

## 2022-05-02 ENCOUNTER — OFFICE VISIT (OUTPATIENT)
Dept: FAMILY MEDICINE | Facility: CLINIC | Age: 27
End: 2022-05-02
Payer: COMMERCIAL

## 2022-05-02 VITALS
HEART RATE: 101 BPM | SYSTOLIC BLOOD PRESSURE: 100 MMHG | TEMPERATURE: 99.3 F | HEIGHT: 64 IN | WEIGHT: 189 LBS | OXYGEN SATURATION: 99 % | RESPIRATION RATE: 18 BRPM | BODY MASS INDEX: 32.27 KG/M2 | DIASTOLIC BLOOD PRESSURE: 62 MMHG

## 2022-05-02 DIAGNOSIS — Z00.00 ROUTINE GENERAL MEDICAL EXAMINATION AT A HEALTH CARE FACILITY: Primary | ICD-10-CM

## 2022-05-02 DIAGNOSIS — E28.2 PCOS (POLYCYSTIC OVARIAN SYNDROME): ICD-10-CM

## 2022-05-02 DIAGNOSIS — Z31.89 ENCOUNTER FOR FERTILITY PLANNING: ICD-10-CM

## 2022-05-02 PROBLEM — F41.9 ANXIETY: Status: ACTIVE | Noted: 2022-05-02

## 2022-05-02 PROCEDURE — 99395 PREV VISIT EST AGE 18-39: CPT | Performed by: NURSE PRACTITIONER

## 2022-05-02 RX ORDER — FLUCONAZOLE 150 MG/1
TABLET ORAL
COMMUNITY
Start: 2022-04-21 | End: 2022-05-02

## 2022-05-02 RX ORDER — AZITHROMYCIN 250 MG/1
TABLET, FILM COATED ORAL
COMMUNITY
Start: 2022-03-18 | End: 2022-05-02

## 2022-05-02 RX ORDER — PRENATAL VIT/IRON FUM/FOLIC AC 27MG-0.8MG
1 TABLET ORAL DAILY
Qty: 90 TABLET | Refills: 3 | Status: SHIPPED | OUTPATIENT
Start: 2022-05-02 | End: 2022-08-01

## 2022-05-02 ASSESSMENT — ENCOUNTER SYMPTOMS
HEMATOCHEZIA: 0
ARTHRALGIAS: 0
NERVOUS/ANXIOUS: 0
BREAST MASS: 0
HEARTBURN: 0
PARESTHESIAS: 0
WEAKNESS: 0
FREQUENCY: 0
SORE THROAT: 0
MYALGIAS: 0
SHORTNESS OF BREATH: 0
HEMATURIA: 0
FEVER: 0
DIZZINESS: 0
DIARRHEA: 0
DYSURIA: 0
COUGH: 0
NAUSEA: 0
JOINT SWELLING: 0
CHILLS: 0
CONSTIPATION: 0
PALPITATIONS: 0
HEADACHES: 0
EYE PAIN: 0
ABDOMINAL PAIN: 0

## 2022-05-02 ASSESSMENT — PAIN SCALES - GENERAL: PAINLEVEL: NO PAIN (0)

## 2022-05-02 NOTE — PATIENT INSTRUCTIONS
Prenatal vitamin with DHA, folic acid  Preventive Health Recommendations  Female Ages 26 - 39  Yearly exam:   See your health care provider every year in order to    Review health changes.     Discuss preventive care.      Review your medicines if you your doctor has prescribed any.    Until age 30: Get a Pap test every three years (more often if you have had an abnormal result).    After age 30: Talk to your doctor about whether you should have a Pap test every 3 years or have a Pap test with HPV screening every 5 years.   You do not need a Pap test if your uterus was removed (hysterectomy) and you have not had cancer.  You should be tested each year for STDs (sexually transmitted diseases), if you're at risk.   Talk to your provider about how often to have your cholesterol checked.  If you are at risk for diabetes, you should have a diabetes test (fasting glucose).  Shots: Get a flu shot each year. Get a tetanus shot every 10 years.   Nutrition:     Eat at least 5 servings of fruits and vegetables each day.    Eat whole-grain bread, whole-wheat pasta and brown rice instead of white grains and rice.    Get adequate Calcium and Vitamin D.     Lifestyle    Exercise at least 150 minutes a week (30 minutes a day, 5 days of the week). This will help you control your weight and prevent disease.    Limit alcohol to one drink per day.    No smoking.     Wear sunscreen to prevent skin cancer.    See your dentist every six months for an exam and cleaning.

## 2022-05-02 NOTE — PROGRESS NOTES
SUBJECTIVE:   CC: Jackelin Sal is an 26 year old woman who presents for preventive health visit.   Patient has been advised of split billing requirements and indicates understanding: Yes  Healthy Habits:     Getting at least 3 servings of Calcium per day:  Yes    Bi-annual eye exam:  Yes    Dental care twice a year:  Yes    Sleep apnea or symptoms of sleep apnea:  Daytime drowsiness and Excessive snoring    Diet:  Regular (no restrictions)    Frequency of exercise:  2-3 days/week    Duration of exercise:  30-45 minutes    Taking medications regularly:  Yes    Medication side effects:  None    PHQ-2 Total Score: 2    Additional concerns today:  No      Today's PHQ-2 Score:   PHQ-2 ( 1999 Pfizer) 5/2/2022   Q1: Little interest or pleasure in doing things 1   Q2: Feeling down, depressed or hopeless 1   PHQ-2 Score 2   PHQ-2 Total Score (12-17 Years)- Positive if 3 or more points; Administer PHQ-A if positive -   Q1: Little interest or pleasure in doing things Several days   Q2: Feeling down, depressed or hopeless Several days   PHQ-2 Score 2       Abuse: Current or Past (Physical, Sexual or Emotional) - No  Do you feel safe in your environment? Yes    Have you ever done Advance Care Planning? (For example, a Health Directive, POLST, or a discussion with a medical provider or your loved ones about your wishes): No, advance care planning information given to patient to review.  Patient plans to discuss their wishes with loved ones or provider.      Social History     Tobacco Use     Smoking status: Never Smoker     Smokeless tobacco: Never Used   Substance Use Topics     Alcohol use: Yes     If you drink alcohol do you typically have >3 drinks per day or >7 drinks per week? No    Alcohol Use 5/2/2022   Prescreen: >3 drinks/day or >7 drinks/week? No   Prescreen: >3 drinks/day or >7 drinks/week? -   No flowsheet data found.    Reviewed orders with patient.  Reviewed health maintenance and updated orders  "accordingly -       Breast Cancer Screening:    Breast CA Risk Assessment (FHS-7) 5/2/2022   Do you have a family history of breast, colon, or ovarian cancer? No / Unknown         Patient under 40 years of age: Routine Mammogram Screening not recommended.   Pertinent mammograms are reviewed under the imaging tab.    History of abnormal Pap smear: NO - age 21-29 PAP every 3 years recommended  PAP / HPV 9/23/2019 10/3/2016   PAP (Historical) NIL NIL     Reviewed and updated as needed this visit by clinical staff    Allergies  Meds                Reviewed and updated as needed this visit by Provider                   Past Medical History:   Diagnosis Date     Concussion 2010    x2     PCOS (polycystic ovarian syndrome)       Past Surgical History:   Procedure Laterality Date     wisedome teeth         Review of Systems   Constitutional: Negative for chills and fever.   HENT: Negative for congestion, ear pain, hearing loss and sore throat.    Eyes: Negative for pain and visual disturbance.   Respiratory: Negative for cough and shortness of breath.    Cardiovascular: Negative for chest pain, palpitations and peripheral edema.   Gastrointestinal: Negative for abdominal pain, constipation, diarrhea, heartburn, hematochezia and nausea.   Breasts:  Negative for tenderness, breast mass and discharge.   Genitourinary: Positive for vaginal bleeding. Negative for dysuria, frequency, genital sores, hematuria, pelvic pain, urgency and vaginal discharge.   Musculoskeletal: Negative for arthralgias, joint swelling and myalgias.   Skin: Negative for rash.   Neurological: Negative for dizziness, weakness, headaches and paresthesias.   Psychiatric/Behavioral: Negative for mood changes. The patient is not nervous/anxious.         OBJECTIVE:   /62   Pulse 101   Temp 99.3  F (37.4  C) (Temporal)   Resp 18   Ht 1.626 m (5' 4\")   Wt 85.7 kg (189 lb)   SpO2 99%   BMI 32.44 kg/m    Physical Exam  GENERAL: healthy, alert and no " "distress  EYES: Eyes grossly normal to inspection, PERRL and conjunctivae and sclerae normal  HENT: ear canals and TM's normal, nose and mouth without ulcers or lesions  NECK: no adenopathy, no asymmetry, masses, or scars and thyroid normal to palpation  RESP: lungs clear to auscultation - no rales, rhonchi or wheezes  BREAST: normal without masses, tenderness or nipple discharge and no palpable axillary masses or adenopathy  CV: regular rate and rhythm, normal S1 S2, no S3 or S4, no murmur, click or rub, no peripheral edema and peripheral pulses strong  ABDOMEN: soft, nontender, no hepatosplenomegaly, no masses and bowel sounds normal  MS: no gross musculoskeletal defects noted, no edema  SKIN: no suspicious lesions or rashes  NEURO: Normal strength and tone, mentation intact and speech normal  PSYCH: mentation appears normal, affect normal/bright    Diagnostic Test Results:  Labs reviewed in Epic  none     ASSESSMENT/PLAN:   (Z00.00) Routine general medical examination at a health care facility  (primary encounter diagnosis)  Comment: recommend yearly physicals    (Z31.89) Encounter for fertility planning  Comment: start pre  vitamins  Plan: Prenatal Vit-Fe Fumarate-FA (PRENATAL         MULTIVITAMIN W/IRON) 27-0.8 MG tablet    (E28.2) PCOS (polycystic ovarian syndrome)  Comment: irregular cycles- may need help with fertility    Patient has been advised of split billing requirements and indicates understanding: No    COUNSELING:  Reviewed preventive health counseling, as reflected in patient instructions    Estimated body mass index is 32.44 kg/m  as calculated from the following:    Height as of this encounter: 1.626 m (5' 4\").    Weight as of this encounter: 85.7 kg (189 lb).    Weight management plan: Discussed healthy diet and exercise guidelines    She reports that she has never smoked. She has never used smokeless tobacco.      Counseling Resources:  ATP IV Guidelines  Pooled Cohorts Equation " Calculator  Breast Cancer Risk Calculator  BRCA-Related Cancer Risk Assessment: FHS-7 Tool  FRAX Risk Assessment  ICSI Preventive Guidelines  Dietary Guidelines for Americans, 2010  USDA's MyPlate  ASA Prophylaxis  Lung CA Screening    Nanette Coronado NP  [facility]

## 2022-05-17 ENCOUNTER — E-VISIT (OUTPATIENT)
Dept: FAMILY MEDICINE | Facility: CLINIC | Age: 27
End: 2022-05-17
Payer: COMMERCIAL

## 2022-05-17 DIAGNOSIS — M79.646 PAIN OF FINGER, UNSPECIFIED LATERALITY: Primary | ICD-10-CM

## 2022-05-17 PROCEDURE — 99207 PR NON-BILLABLE SERV PER CHARTING: CPT | Performed by: NURSE PRACTITIONER

## 2022-05-18 NOTE — PATIENT INSTRUCTIONS
Thank you for choosing us for your care. I think an in-clinic visit would be best next steps based on your symptoms. Please schedule a clinic appointment; you won t be charged for this eVisit.      You can schedule an appointment right here in [facility], or call [phone]

## 2022-05-24 ENCOUNTER — OFFICE VISIT (OUTPATIENT)
Dept: FAMILY MEDICINE | Facility: CLINIC | Age: 27
End: 2022-05-24
Payer: COMMERCIAL

## 2022-05-24 VITALS
SYSTOLIC BLOOD PRESSURE: 110 MMHG | OXYGEN SATURATION: 99 % | DIASTOLIC BLOOD PRESSURE: 62 MMHG | BODY MASS INDEX: 31.76 KG/M2 | WEIGHT: 185 LBS | HEART RATE: 98 BPM | RESPIRATION RATE: 14 BRPM

## 2022-05-24 DIAGNOSIS — M79.645 PAIN OF FINGER OF LEFT HAND: Primary | ICD-10-CM

## 2022-05-24 PROCEDURE — 99213 OFFICE O/P EST LOW 20 MIN: CPT | Performed by: FAMILY MEDICINE

## 2022-05-24 RX ORDER — INDOMETHACIN 50 MG/1
50 CAPSULE ORAL
Qty: 21 CAPSULE | Refills: 0 | Status: SHIPPED | OUTPATIENT
Start: 2022-05-24 | End: 2022-08-04

## 2022-05-24 NOTE — PROGRESS NOTES
Assessment & Plan     Pain of finger of left hand  10 days of L 3rd PIP joint pain and swelling, with occasional redness. FROM. Had great toe flare in 2019 and dx'ed as gout with good response to indomethicin. No family history. Avoids red wine which seems to trigger it. Cannot take uric acid levels due to acute flare.  As she does not fit the general demographic profile of gout suffers, will refer to rheumatology for a further work-up.  She denies, however, any personal or family history of rheumatologic disease.  As she is also trying to conceive and is currently on a prenatal vitamin, we counseled her on potential options for gout prevention medications.  We will plan to take urate level at future visit.  - indomethacin (INDOCIN) 50 MG capsule; Take 1 capsule (50 mg) by mouth 3 times daily (with meals) for 7 days  - Adult Rheumatology  Referral; Future      D. Darwin Tilley [facility] MS3 05/24/22 5:18 PM      I have reviewed today's vital signs, notes, medications, labs and imaging. I have also seen and examined the patient and agree with the findings and plan as outlined above.    Tigre Allen MD  [facility]    Maude Mg is a 26 year old who presents for the following health issues     Arthritis    History of Present Illness       Reason for visit:  Possible gout in finger    She eats 4 or more servings of fruits and vegetables daily.She consumes 0 sweetened beverage(s) daily.She exercises with enough effort to increase her heart rate 30 to 60 minutes per day.  She exercises with enough effort to increase her heart rate 4 days per week.   She is taking medications regularly.             Review of Systems   Musculoskeletal: Positive for arthritis.      Constitutional, HEENT, cardiovascular, pulmonary, gi and gu systems are negative, except as otherwise noted.      Objective    /62   Pulse 98   Resp 14   Wt 83.9 kg (185 lb)   SpO2 99%   BMI 31.76 kg/m    Body  mass index is 31.76 kg/m .  Physical Exam   GENERAL: healthy, alert and no distress  CV/PULM: Breathing comfortably on room air without distress or retractions  MS: no gross musculoskeletal defects noted, no edema.  Pain to palpation of left third finger PIP joint approximately 3 out of 10 with light palpation.  Full range of motion intact with moderate pain.  No overlying erythema.  Very mild swelling compared to right.  Grossly normal in appearance.  PSYCH: mentation appears normal, affect normal/bright

## 2022-07-31 ENCOUNTER — MYC MEDICAL ADVICE (OUTPATIENT)
Dept: FAMILY MEDICINE | Facility: CLINIC | Age: 27
End: 2022-07-31

## 2022-08-01 ENCOUNTER — VIRTUAL VISIT (OUTPATIENT)
Dept: FAMILY MEDICINE | Facility: CLINIC | Age: 27
End: 2022-08-01
Payer: COMMERCIAL

## 2022-08-01 DIAGNOSIS — Z34.00 SUPERVISION OF NORMAL FIRST PREGNANCY: Primary | ICD-10-CM

## 2022-08-01 PROCEDURE — 99207 PR NO CHARGE NURSE ONLY: CPT

## 2022-08-01 NOTE — PROGRESS NOTES
OB Intake phone visit with verbal patient permission.    Saurav has not tested positive for covid.  She has received 2 initial doses of Moderna covid vaccine.

## 2022-08-02 DIAGNOSIS — Z31.69 ENCOUNTER FOR PRECONCEPTION CONSULTATION: ICD-10-CM

## 2022-08-03 NOTE — PROGRESS NOTES
Rheumatology Clinic Visit  [facility]  JONNATHAN Bone     Jackelin Sal MRN# [id number]   YOB: 1995 Age: 26 year old   Date of Visit: 08/03/2022  Primary care provider: Nanette Coronado          Assessment and Plan:     1. Pain in finger    Patient presents today for an initial evaluation of pain in her left middle PIP.  This happened in May of this year.  She states that she woke up with severe pain in that knuckle.  She had redness and swelling as well.  Severe pain when trying to bend her finger.  She states she had a similar episode in 2019 in her right foot however the pain was much more severe at that time.  Both of these episodes resolved with indomethacin.  She has never had her uric acid checked.  She does have a family history of psoriasis.  Physical examination was unremarkable.  No synovitis, dactylitis, tenosynovitis or enthesopathy.  Full joint range of motion.  Normal  strength.  No evidence of tophi.    Reviewed with the patient that it does sound like she had a second episode of gout.  She is in her first trimester of pregnancy.  Treatment for gout would be very limited during pregnancy.  Would recommend monitoring for now.  An episode of gout once every 3 years would not necessitate urate lowering therapy.  It is interesting as she is not the typical demographic for gout.  Would recommend continuing to monitor for now and certainly if she has an increase in frequency of these episodes would recommend running blood work.  She reports that her father has psoriasis therefore she does have the potential for psoriatic arthritis, however given the infrequency of her symptoms would not recommend immunomodulatory therapy.  Patient to follow-up with rheumatology as needed.    JONNATHAN Bone  Rheumatology         History of Present Illness:   Jackelin Sal presents for evaluation of joint pain.     She had pain in her left hand, middle knuckle. She woke up  one morning in May and she had severe pain in that knuckle. There was no pain in her hand or down the finger. She just had pain in the knuckle. It was swollen and hot. She had severe pain with bending it. In 2019 she had pain in her right foot, that was more severe than the pain in her finger. She couldn't drive or put a sock on. It was treated as gout. She was given Indomethacin and this cleared it up. She got indomethacin for her finger as well. This helped and her finger pain/swelling cleared within 4 days. Since the episode in 2019, the night before she had drank red wine. She has noticed that if she has a dryer red wine she will get some pain in her foot but usually clears on its own. She is unsure of a trigger for the finger episode. No family history of gout. No known family history of RA or lupus. No personal or family history of ulcerative colitis or crohn's. Father with psoriasis. She has occasional low back pain. No skin rashes or mouth sores. No hair loss or balding spots. She denies dry eyes or dry mouth.          Review of Systems:     Constitutional: negative  Skin: negative  Eyes: negative  Ears/Nose/Throat: negative  Respiratory: No shortness of breath, dyspnea on exertion, cough, or hemoptysis  Cardiovascular: negative  Gastrointestinal: negative  Genitourinary: negative  Musculoskeletal: As above  Neurologic: negative  Psychiatric: negative  Hematologic/Lymphatic/Immunologic: negative  Endocrine: negative         Active Problem List:     Patient Active Problem List    Diagnosis Date Noted     Anxiety 05/02/2022     Priority: Medium     PCOS (polycystic ovarian syndrome) 05/02/2022     Priority: Medium     CARDIOVASCULAR SCREENING; LDL GOAL LESS THAN 160 06/10/2014     Priority: Medium     Family history of cardiac arrhythmia 08/04/2010     Priority: Medium            Past Medical History:     Past Medical History:   Diagnosis Date     Concussion 2010    x2     History of migraine      PCOS  (polycystic ovarian syndrome)      Past Surgical History:   Procedure Laterality Date     WISDOM TOOTH EXTRACTION              Social History:     Social History     Socioeconomic History     Marital status:      Spouse name: Not on file     Number of children: Not on file     Years of education: Not on file     Highest education level: Not on file   Occupational History     Not on file   Tobacco Use     Smoking status: Never Smoker     Smokeless tobacco: Never Used   Substance and Sexual Activity     Alcohol use: Yes     Drug use: No     Sexual activity: Yes     Partners: Male     Birth control/protection: Pill   Other Topics Concern     Parent/sibling w/ CABG, MI or angioplasty before 65F 55M? No   Social History Narrative    8/2022  Lives in [place] with , [place].  No indoor cats/kittens.  No smokers in the home.  No concerns about domestic violence.  Saurav jones  in [place], [place].     Social Determinants of Health     Financial Resource Strain: Not on file   Food Insecurity: Not on file   Transportation Needs: Not on file   Physical Activity: Not on file   Stress: Not on file   Social Connections: Not on file   Intimate Partner Violence: Not on file   Housing Stability: Not on file          Family History:     Family History   Problem Relation Age of Onset     Depression Mother      Anxiety Disorder Mother      Hyperthyroidism Mother      Heart Disease Father         wpw     Anxiety Disorder Father      Jamar Parkinson White syndrome Father      No Known Problems Brother      Peripheral Vascular Disease Maternal Grandmother      Thyroid Disease Maternal Grandmother      Hypertension Maternal Grandfather      Deep Vein Thrombosis Maternal Grandfather 40     Hyperlipidemia Paternal Grandmother      Hypertension Paternal Grandfather      Deep Vein Thrombosis Maternal Aunt 40        has genetic mutation for clotting     Hypothyroidism Maternal Aunt      C.A.D. No family hx of      Breast  Cancer No family hx of             Allergies:   No Known Allergies         Medications:     Current Outpatient Medications   Medication Sig Dispense Refill     indomethacin (INDOCIN) 50 MG capsule Take 1 capsule (50 mg) by mouth 3 times daily (with meals) for 7 days 21 capsule 0     Prenatal Vit-Fe Fumarate-FA (PRENATAL MULTIVITAMIN W/IRON) 27-0.8 MG tablet Take 1 tablet by mouth daily 90 tablet 7            Physical Exam:   Last menstrual period 07/03/2022, not currently breastfeeding.  Wt Readings from Last 6 Encounters:   05/24/22 83.9 kg (185 lb)   05/02/22 85.7 kg (189 lb)   05/07/21 84.4 kg (186 lb)   08/14/20 75.5 kg (166 lb 8 oz)   05/25/20 72.4 kg (159 lb 9.6 oz)   09/23/19 74.4 kg (164 lb)     Constitutional: well-developed, appearing stated age; cooperative  Eyes: nl  PERRLA, vision, conjunctiva, sclera  ENT: nl external ears, nose, hearing, lips, teeth, gums, throat. No mucositis.   No mucous membrane lesions, normal saliva pool  Neck: no mass or thyroid enlargement  Resp: lungs clear to auscultation  CV: RRR, no murmurs, rubs or gallops, no edema  Lymph: no cervical, supraclavicular or epitrochlear nodes  MS: The TMJ,shoulder, elbow, wrist, MCP/PIP/DIP,  knee, ankle, and foot MTP/IP joints were examined and found normal. No active synovitis or altered joint anatomy. Full joint ROM. Normal  strength. No dactylitis,  tenosynovitis, enthesopathy.   Skin: no nail pitting, alopecia, rash, nodules or lesions.   Neuro: nl cranial nerves.   Psych: nl judgement, orientation, memory, affect.           Data:   Imaging:  None    Laboratory:  5/7/2021  TSH 1.83  Vitamin D deficiency screening 21  Hepatitis C nonreactive  HIV nonreactive  Thyroid peroxidase antibody less than 10

## 2022-08-04 ENCOUNTER — OFFICE VISIT (OUTPATIENT)
Dept: RHEUMATOLOGY | Facility: CLINIC | Age: 27
End: 2022-08-04
Attending: FAMILY MEDICINE
Payer: COMMERCIAL

## 2022-08-04 VITALS
BODY MASS INDEX: 31.76 KG/M2 | TEMPERATURE: 97.8 F | WEIGHT: 186 LBS | HEIGHT: 64 IN | SYSTOLIC BLOOD PRESSURE: 102 MMHG | DIASTOLIC BLOOD PRESSURE: 70 MMHG

## 2022-08-04 DIAGNOSIS — M79.645 PAIN OF FINGER OF LEFT HAND: ICD-10-CM

## 2022-08-04 PROCEDURE — 99203 OFFICE O/P NEW LOW 30 MIN: CPT | Performed by: PHYSICIAN ASSISTANT

## 2022-08-04 RX ORDER — PRENATAL VIT/IRON FUM/FOLIC AC 27MG-0.8MG
1 TABLET ORAL DAILY
Qty: 90 TABLET | Refills: 7 | Status: SHIPPED | OUTPATIENT
Start: 2022-08-04

## 2022-08-04 ASSESSMENT — PAIN SCALES - GENERAL: PAINLEVEL: NO PAIN (0)

## 2022-08-04 NOTE — NURSING NOTE
Chief Complaint   Patient presents with     Joint Pain     Having some flare ups on her right foot and in the joints of her fingers. Since 2019.       MP/MA

## 2022-08-04 NOTE — PATIENT INSTRUCTIONS
After Visit Instructions:     Thank you for coming to [facility] for your care. It is my goal to partner with you to help you reach your optimal state of health.       Plan:     Schedule follow-up with Rebekah Mabry PA-C as needed.   Labs: consider checking uric acid in future      Rebekah Mabry PA-C  [facility]  [place]    Contact information: [facility]  Clinic Number:  [phone]  Please call or send a [employer] message with any questions about your care

## 2022-08-12 ENCOUNTER — MYC MEDICAL ADVICE (OUTPATIENT)
Dept: FAMILY MEDICINE | Facility: CLINIC | Age: 27
End: 2022-08-12

## 2022-08-12 ENCOUNTER — LAB (OUTPATIENT)
Dept: LAB | Facility: CLINIC | Age: 27
End: 2022-08-12

## 2022-08-12 ENCOUNTER — E-VISIT (OUTPATIENT)
Dept: FAMILY MEDICINE | Facility: CLINIC | Age: 27
End: 2022-08-12
Payer: COMMERCIAL

## 2022-08-12 DIAGNOSIS — N89.8 VAGINAL ODOR: ICD-10-CM

## 2022-08-12 DIAGNOSIS — N89.8 VAGINAL ODOR: Primary | ICD-10-CM

## 2022-08-12 LAB
CLUE CELLS: ABNORMAL
TRICHOMONAS, WET PREP: ABNORMAL
WBC'S/HIGH POWER FIELD, WET PREP: ABNORMAL
YEAST, WET PREP: ABNORMAL

## 2022-08-12 PROCEDURE — 87210 SMEAR WET MOUNT SALINE/INK: CPT

## 2022-08-12 PROCEDURE — 99421 OL DIG E/M SVC 5-10 MIN: CPT | Performed by: NURSE PRACTITIONER

## 2022-08-12 NOTE — PATIENT INSTRUCTIONS
Thank you for choosing us for your care. Given your symptoms, I would like you to do a lab-only visit to determine what is causing them.  I have placed the orders.  Please schedule an appointment with the lab right here in [facility], or call [phone].  I will let you know when the results are back and next steps to take.

## 2022-08-12 NOTE — TELEPHONE ENCOUNTER
Provider E-Visit time total (minutes): 8   Called to discuss wet prep.  She will leave sample. . Nanette Coronado, CNP

## 2022-08-12 NOTE — TELEPHONE ENCOUNTER
Advised patient to let us know if e-visit advises she be seen in clinic and instructed her to call clinic if so.     LENA RobertsonN, RN

## 2022-08-17 ENCOUNTER — NURSE TRIAGE (OUTPATIENT)
Dept: FAMILY MEDICINE | Facility: CLINIC | Age: 27
End: 2022-08-17

## 2022-08-17 DIAGNOSIS — Z34.01 PREGNANCY, FIRST, FIRST TRIMESTER: Primary | ICD-10-CM

## 2022-08-17 NOTE — TELEPHONE ENCOUNTER
"Patient calling regarding some mild cramping she had yesterday and now today some spotting. Amount of blood was only a small amount on toilet paper when she wiped. Denies bleeding other than that small amount. Denies tissue being passed. Denies current cramping. Still has breast tenderness at the moment.     RN advised patient this can be common in early pregnancy and reviewed red flag symptoms with patient and when to call back. Also relayed clinical reference of \"Bleeding During Early Pregnancy\" to patient. Patient understood and agreed. Patient has a lab appointment on 8/31.     Will route to PCP to see if they would like patient to do anything prior to lab appointment on 8/31.     JANE Robertson, RN           Reason for Disposition    SPOTTING (single or brief episode)    Additional Information    Negative: Shock suspected (e.g., cold/pale/clammy skin, too weak to stand, low BP, rapid pulse)    Negative: Difficult to awaken or acting confused (e.g., disoriented, slurred speech)    Negative: Passed out (i.e., fainted, collapsed and was not responding)    Negative: Sounds like a life-threatening emergency to the triager    Negative: Vaginal bleeding and pregnant 20 or more weeks    Negative: Not pregnant or pregnancy status unknown    Negative: SEVERE abdominal pain (e.g., excruciating)    Negative: SEVERE vaginal bleeding (e.g., soaking 2 pads or tampons per hour and present 2 or more hours; 1 menstrual cup every 2 hours)    Negative: SEVERE dizziness (e.g., unable to stand, requires support to walk, feels like passing out)    Negative: MODERATE vaginal bleeding (i.e., soaking 1 pad) and present > 6 hours    Negative: MODERATE vaginal bleeding (i.e., soaking 1 pad / hour, clots) and pregnant > 12 weeks    Negative: Passed tissue (e.g., gray-white)    Negative: Shoulder pain    Negative: Constant abdominal pain lasting > 1 hour    Negative: Fever > 100.4 F (38.0 C)    Negative: Pale skin (pallor) of " new-onset or worsening    Negative: Patient sounds very sick or weak to the triager    Negative: MODERATE vaginal bleeding (i.e., soaking 1 pad / hour; clots)    Negative: Intermittent lower abdominal pain (e.g., cramping) lasting > 24 hours    Negative: Pain or burning with passing urine (urination)    Negative: Prior history of 'ectopic pregnancy' or previous tubal surgery (e.g., tubal ligation)    Negative: Patient wants to be seen    Negative: MILD vaginal bleeding (i.e., less than 1 pad / hour; less than patient's usual menstrual bleeding; not just spotting)    Negative: SPOTTING lasting > 48 hours or spotting happens more than once in a week    Negative: Has IUD    Negative: Not feeling pregnant any longer (e.g., breast tenderness or nausea has disappeared)    Negative: Unusual vaginal discharge (e.g., bad smelling, yellow, green, or foamy-white)    Negative: SPOTTING after sexual intercourse (single or brief episode)    Negative: SPOTTING after a pelvic examination (single or brief episode)    Protocols used: PREGNANCY - VAGINAL BLEEDING LESS THAN 20 WEEKS EGA-A-OH

## 2022-08-18 ENCOUNTER — MYC MEDICAL ADVICE (OUTPATIENT)
Dept: FAMILY MEDICINE | Facility: CLINIC | Age: 27
End: 2022-08-18

## 2022-08-18 DIAGNOSIS — O20.0 THREATENED MISCARRIAGE IN EARLY PREGNANCY: Primary | ICD-10-CM

## 2022-08-18 NOTE — TELEPHONE ENCOUNTER
Called patient.  Has had some more bleeding similar to her period.  No more clots.   Pain has improved.  She has not quite filled a panty liner.  Discussed reasons to go to ED tonight.  Will get pelvic us and hcg with ABO tomorrow and appointment with Dr. Dejesus tomorrow.  Nanette Coronado, CNP

## 2022-08-19 ENCOUNTER — OFFICE VISIT (OUTPATIENT)
Dept: FAMILY MEDICINE | Facility: CLINIC | Age: 27
End: 2022-08-19
Payer: COMMERCIAL

## 2022-08-19 ENCOUNTER — LAB (OUTPATIENT)
Dept: LAB | Facility: CLINIC | Age: 27
End: 2022-08-19
Payer: COMMERCIAL

## 2022-08-19 ENCOUNTER — HOSPITAL ENCOUNTER (OUTPATIENT)
Dept: ULTRASOUND IMAGING | Facility: CLINIC | Age: 27
Discharge: HOME | End: 2022-08-19
Attending: NURSE PRACTITIONER | Admitting: NURSE PRACTITIONER
Payer: COMMERCIAL

## 2022-08-19 VITALS
SYSTOLIC BLOOD PRESSURE: 109 MMHG | OXYGEN SATURATION: 97 % | TEMPERATURE: 98.4 F | BODY MASS INDEX: 32.58 KG/M2 | DIASTOLIC BLOOD PRESSURE: 75 MMHG | HEART RATE: 74 BPM | RESPIRATION RATE: 13 BRPM | WEIGHT: 189.8 LBS

## 2022-08-19 DIAGNOSIS — Z34.00 SUPERVISION OF NORMAL FIRST PREGNANCY: ICD-10-CM

## 2022-08-19 DIAGNOSIS — Z34.01 PREGNANCY, FIRST, FIRST TRIMESTER: ICD-10-CM

## 2022-08-19 DIAGNOSIS — O20.0 THREATENED MISCARRIAGE IN EARLY PREGNANCY: ICD-10-CM

## 2022-08-19 DIAGNOSIS — O03.9 MISCARRIAGE: Primary | ICD-10-CM

## 2022-08-19 LAB
ABO/RH(D): NORMAL
ANTIBODY SCREEN: NEGATIVE
B-HCG SERPL-ACNC: <1 IU/L (ref 0–5)
SPECIMEN EXPIRATION DATE: NORMAL

## 2022-08-19 PROCEDURE — 99213 OFFICE O/P EST LOW 20 MIN: CPT | Performed by: FAMILY MEDICINE

## 2022-08-19 PROCEDURE — 36415 COLL VENOUS BLD VENIPUNCTURE: CPT

## 2022-08-19 PROCEDURE — 86901 BLOOD TYPING SEROLOGIC RH(D): CPT

## 2022-08-19 PROCEDURE — 76801 OB US < 14 WKS SINGLE FETUS: CPT

## 2022-08-19 PROCEDURE — 86900 BLOOD TYPING SEROLOGIC ABO: CPT

## 2022-08-19 PROCEDURE — 87086 URINE CULTURE/COLONY COUNT: CPT

## 2022-08-19 PROCEDURE — 86850 RBC ANTIBODY SCREEN: CPT

## 2022-08-19 PROCEDURE — 84702 CHORIONIC GONADOTROPIN TEST: CPT

## 2022-08-20 LAB — BACTERIA UR CULT: NORMAL

## 2022-08-22 NOTE — PROGRESS NOTES
Assessment & Plan       ICD-10-CM    1. Miscarriage  O03.9         Most likely she is completing a miscarriage.  Her and her healthy work-up appropriately tearful with this news.  They are hopeful to become pregnant.  I gave them reassurance and normalized the situation given the high frequency that miscarriages occur.  We discussed that we would offer her further testing if on her third miscarriage.  They may resume attempted be pregnant, and intercourse, when her bleeding resolves and she feels comfortable proceeding.  They agree.      No follow-ups on file.    Morales Dejesus MD  [facility] is a 26 year old female who presents to clinic today for the following health issues     HPI       Has had vaginal bleeding now for the past 3 to 4 days.  Was quite heavy yesterday, soaking 3-4 pads through the day, now today cramping and bleeding is improved.  She had a positive pregnancy test at home last week.  This would make her about a month since her LMP.  Her and her hubby have been attempting pregnancy.  Today she was brought in due to the bleeding and a pelvic ultrasound showed a possible gestational sac in the lower uterine segment but no yolk sac or fetal pole.  Normal ovaries.  No adnexal masses.  Her beta-hCG is 0 today      Review of Systems         Objective    /75 (BP Location: Left arm, Patient Position: Sitting, Cuff Size: Adult Regular)   Pulse 74   Temp 98.4  F (36.9  C)   Resp 13   Wt 86.1 kg (189 lb 12.8 oz)   LMP 07/03/2022   SpO2 97%   BMI 32.58 kg/m       Physical Exam

## 2022-10-24 ENCOUNTER — MYC MEDICAL ADVICE (OUTPATIENT)
Dept: FAMILY MEDICINE | Facility: CLINIC | Age: 27
End: 2022-10-24

## 2022-10-24 DIAGNOSIS — N92.6 MISSED PERIOD: Primary | ICD-10-CM

## 2022-10-25 ENCOUNTER — LAB (OUTPATIENT)
Dept: LAB | Facility: CLINIC | Age: 27
End: 2022-10-25
Payer: COMMERCIAL

## 2022-10-25 DIAGNOSIS — N92.6 MISSED PERIOD: ICD-10-CM

## 2022-10-25 LAB — B-HCG SERPL-ACNC: <1 IU/L (ref 0–5)

## 2022-10-25 PROCEDURE — 84702 CHORIONIC GONADOTROPIN TEST: CPT

## 2022-10-25 PROCEDURE — 36415 COLL VENOUS BLD VENIPUNCTURE: CPT

## 2023-03-14 ENCOUNTER — TELEPHONE (OUTPATIENT)
Dept: FAMILY MEDICINE | Facility: CLINIC | Age: 28
End: 2023-03-14
Payer: COMMERCIAL

## 2023-03-14 NOTE — TELEPHONE ENCOUNTER
Please call patient.  I do not do fertility medications but Dr. Vazquez does and he has openings as soon as Monday.  Please reschedule as appropriate. Nanette Coronado, CNP

## 2023-03-20 ENCOUNTER — OFFICE VISIT (OUTPATIENT)
Dept: OBGYN | Facility: CLINIC | Age: 28
End: 2023-03-20
Payer: COMMERCIAL

## 2023-03-20 VITALS
OXYGEN SATURATION: 97 % | WEIGHT: 199.7 LBS | HEART RATE: 90 BPM | DIASTOLIC BLOOD PRESSURE: 66 MMHG | BODY MASS INDEX: 34.28 KG/M2 | SYSTOLIC BLOOD PRESSURE: 110 MMHG

## 2023-03-20 DIAGNOSIS — Z12.4 CERVICAL CANCER SCREENING: Primary | ICD-10-CM

## 2023-03-20 DIAGNOSIS — E28.2 PCOS (POLYCYSTIC OVARIAN SYNDROME): ICD-10-CM

## 2023-03-20 LAB
FASTING STATUS PATIENT QL REPORTED: NO
GLUCOSE SERPL-MCNC: 96 MG/DL (ref 70–99)
HBA1C MFR BLD: 5 %
TSH SERPL DL<=0.005 MIU/L-ACNC: 1.64 UIU/ML (ref 0.3–4.2)

## 2023-03-20 PROCEDURE — 36415 COLL VENOUS BLD VENIPUNCTURE: CPT | Performed by: OBSTETRICS & GYNECOLOGY

## 2023-03-20 PROCEDURE — 84443 ASSAY THYROID STIM HORMONE: CPT | Performed by: OBSTETRICS & GYNECOLOGY

## 2023-03-20 PROCEDURE — 99214 OFFICE O/P EST MOD 30 MIN: CPT | Performed by: OBSTETRICS & GYNECOLOGY

## 2023-03-20 PROCEDURE — 82947 ASSAY GLUCOSE BLOOD QUANT: CPT | Performed by: OBSTETRICS & GYNECOLOGY

## 2023-03-20 PROCEDURE — 83036 HEMOGLOBIN GLYCOSYLATED A1C: CPT | Performed by: OBSTETRICS & GYNECOLOGY

## 2023-03-20 RX ORDER — LETROZOLE 2.5 MG/1
TABLET, FILM COATED ORAL
Qty: 10 TABLET | Refills: 0 | Status: SHIPPED | OUTPATIENT
Start: 2023-03-20 | End: 2023-10-17

## 2023-03-20 NOTE — PATIENT INSTRUCTIONS
If you have any questions regarding your visit, Please contact your care team.     [employer]: [phone]    To Schedule an Appointment 24/7  Call: [phone] HOURS TELEPHONE NUMBER     Garrett Vazquez MD    Medical Assistant    Nilesh Ramires-Surgery Scheduler  Jazmine-Surgery Scheduler     Monday-[place]  1:00 pm-4:30 pm    Tuesday-[place]  7:30 am-4:30 pm    Wednesday-[place]  7:30 am-4:30 pm        Typical Surgery Days: Monday or Thursday       [address] [address]  [phone] appointment line  [phone] Fax    Imaging Scheduling all locations  [phone]    **Surgeries** Our Surgery Schedulers will contact you to schedule. If you do not receive a call within 3 business days, please call [phone].    If you need a medication refill, please contact your pharmacy. Please allow 3 business days for your refill to be completed.    As always, Thank you for trusting us with your healthcare needs!                   see additional instructions from your care team below

## 2023-03-20 NOTE — PROGRESS NOTES
SUBJECTIVE:       I spent a total of 35 minutes on the care of Saurav on the day of service including 30 minutes of face-to-face time with remainder in chart review, care coordination, documentation on the day of service.                                                Jackelin Sal is a 27 year old female who presents to clinic today for the following health issue(s):  Patient presents with:  Consult  Fertility    Saurav is a 27-year-old  with last menstrual period of March 15.  She and her significant other have been trying to get pregnant for approximately 6 months.  They had a unfortunate miscarriage last summer.  They started trying to get pregnant in June and by July were already pregnant but an ultrasound done a month later showed a nonviable gestational sac.    Saurav has been diagnosed with polycystic ovaries.  She has both the ultrasound findings consistent with polycystic ovaries as well as the stigmata.  She is also been evaluated in the past and an outside source.  She typically has cycles that range in the mid 30s 4 days between cycles.  She is also had a number of blood test done through [facility] including a normal prolactin, and normal glucose, and a normal TSH.    We spent the bulk of the time talking about infertility work-up.  On the one hand it is only been 6 months since she is been pregnant with an early miscarriage, and additionally she has been able to conceive on her own with her .  However with the history and a diagnosis of polycystic ovaries and 6 months of effort to get pregnant I think it reasonable to start a fertility assistance at this point.  She has done some homework in regards to medications and mention the medication of Clomid and letrozole.  I would start her with letrozole and we talked about going for 6 months with that particular medication and if not pregnant referring her onto reproductive endocrinology.      She of note has an timbo on her phone and tracks  ovulation.  I suggested starting her with letrozole with her next cycle and then having at least 1 set of follicle studies on day 11, 13 and 15 to see if that medication is dose appropriate.  We will call her with the ultrasound phone number and suggest that she go ahead and schedule that.  We also talked about having timed coitus.  She should have a follow-up appointment approximately day 28 of her next cycle to evaluate for pregnancy and/or the next round of letrozole.    We also discussed whether a semen analysis is appropriate and whether an HSG is appropriate.  I would say at this time as long as they have already conceived neither of those tests are needed to be done anytime soon.  If she is moving on towards a reproductive endocrinology then both those test should be done.  She brought up the subject of her  and son as and he notes that they are typically quick as in 15 minutes and I suggest that he uses own judgment but encouraged curtailing that as well as wearing loose underwear and not being in hot tubs or things that would elevate his body temperature would be appropriate for the short duration.  I also advise Saurav that avoiding excessive heat and hot tubs or sauna this would be appropriate too.  Does not sound like she partakes in that activity.    Assessmen:  Subfertility with a history of polycystic ovaries  Successful conception last summer that had a miscarriage within a month or so  History of protracted length of cycles    Plan:  Prescription for letrozole as prescribed  Lab tests including TSH, hemoglobin A1c, and glucose are ordered  Would suggest a follicle studies on day 11, 13 and 15  Follow-up on day 28 of her next cycle after she has tried letrozole on 1 occasion      Today's PHQ-2 Score:   PHQ-2 ( 1999 [employer]) 5/2/2022   Q1: Little interest or pleasure in doing things 1   Q2: Feeling down, depressed or hopeless 1   PHQ-2 Score 2   PHQ-2 Total Score (12-17 Years)- Positive if 3 or  more points; Administer PHQ-A if positive -   Q1: Little interest or pleasure in doing things Several days   Q2: Feeling down, depressed or hopeless Several days   PHQ-2 Score 2     Today's PHQ-9 Score:   PHQ-9 SCORE 5/7/2021   PHQ-9 Total Score 6     Today's MORGAN-7 Score:   MORGAN-7 SCORE 5/7/2021   Total Score 4       Problem list and histories reviewed & adjusted, as indicated.  Additional history: as documented.    Patient Active Problem List   Diagnosis     Family history of cardiac arrhythmia     CARDIOVASCULAR SCREENING; LDL GOAL LESS THAN 160     Anxiety     PCOS (polycystic ovarian syndrome)     Past Surgical History:   Procedure Laterality Date     WISDOM TOOTH EXTRACTION        Social History     Tobacco Use     Smoking status: Never     Smokeless tobacco: Never   Substance Use Topics     Alcohol use: Yes      Problem (# of Occurrences) Relation (Name,Age of Onset)    Anxiety Disorder (2) Mother (Renetta), Father (Garrett)    Depression (1) Mother (Renetta)    Heart Disease (1) Father (Garrett): wpw    Hypertension (2) Maternal Grandfather, Paternal Grandfather    Thyroid Disease (1) Maternal Grandmother    Jamar Parkinson White syndrome (1) Father (Garrett)    Hyperlipidemia (1) Paternal Grandmother    Deep Vein Thrombosis (2) Maternal Grandfather (40), Maternal Aunt (40): has genetic mutation for clotting    Hyperthyroidism (1) Mother (Renetta)    Hypothyroidism (1) Maternal Aunt    Peripheral Vascular Disease (1) Maternal Grandmother    No Known Problems (1) Brother (Artemio)       Negative family history of: C.A.D., Breast Cancer            Current Outpatient Medications   Medication Sig     Prenatal Vit-Fe Fumarate-FA (PRENATAL MULTIVITAMIN W/IRON) 27-0.8 MG tablet Take 1 tablet by mouth daily     No current facility-administered medications for this visit.     No Known Allergies        OBJECTIVE:     There were no vitals taken for this visit.  There is no height or weight on file to calculate  BMI.    Exam:  Deferred    In-Clinic Test Results:  No results found for this or any previous visit (from the past 24 hour(s)).    ASSESSMENT/PLAN:                                                        ICD-10-CM    1. Cervical cancer screening  Z12.4         Subfertility with a history of polycystic ovaries    Patient Instructions                                                        If you have any questions regarding your visit, Please contact your care team.     [employer]: [phone]    To Schedule an Appointment 24/7  Call: [phone] HOURS TELEPHONE NUMBER     Garrett Vazquez MD    Medical Assistant    Nilesh Ramires-Surgery Scheduler  Jazmine-Surgery Scheduler     Monday-[place]  1:00 pm-4:30 pm    Tuesday-[place]  7:30 am-4:30 pm    [facility]30 [facility]        Typical Surgery Days: Monday or Thursday       [address] [address]  [phone] appointment line  [phone] Fax    Imaging Scheduling all locations  [phone]    **Surgeries** Our Surgery Schedulers will contact you to schedule. If you do not receive a call within 3 business days, please call [phone].    If you need a medication refill, please contact your pharmacy. Please allow 3 business days for your refill to be completed.    As always, Thank you for trusting us with your healthcare needs!                   see additional instructions from your care team below            Garrett Vazquez MD  [facility]

## 2023-04-13 ENCOUNTER — MYC MEDICAL ADVICE (OUTPATIENT)
Dept: OBGYN | Facility: CLINIC | Age: 28
End: 2023-04-13
Payer: COMMERCIAL

## 2023-04-13 DIAGNOSIS — N97.9 FEMALE INFERTILITY: Primary | ICD-10-CM

## 2023-04-13 NOTE — TELEPHONE ENCOUNTER
"Per 3/20/23 OV plan with Dr. Vazquez:  \"Plan:  Prescription for letrozole as prescribed  Lab tests including TSH, hemoglobin A1c, and glucose are ordered  Would suggest a follicle studies on day 11, 13 and 15  Follow-up on day 28 of her next cycle after she has tried letrozole on 1 occasion\"    Pt is writing in stating she will be starting the letrozole within the next couple of weeks.  She has already had the above lab tests: TSH, A1C and glucose.  Pt knows she needs to f/u with Dr. Vazquez on day #28 of her cycle but she is asking if she needs to do a lab test to check her progesterone level to see if the letrozole caused her to ovulate.      I do not see that a progesterone lab test has been ordered for day #21 of pt's cycle.  I do see that Dr. Vazquez suggested pt do follicle studies on day 11, 13 and 15, however, I do not see that this was ordered either.    Routing to Dr. Vazquez to see if he wants pt to get a progesterone level drawn on day #21 of her cycle after taking the letrozole to see if she ovulated or if he just prefers her to do the follicle studies on day 11, 13 and 15.  If just the follicle studies are needed please place the appropriate orders.    Mary Carrero RN    "

## 2023-04-17 NOTE — TELEPHONE ENCOUNTER
"Garrett Vazquez MD Net[place], Heidi, RN  Cc: [facility] Triage  Phone Number: [phone]     \"If you want to communicate back with Saurav, let her know that I didn't put the progesterone lab on prior to her getting the other tests done as too often the lab draws everything and a day #3 progesterone would be meaningless.     The other point though is that even though the 3 ways of checking for ovulation ( LH kit, Progesterone, or Follicle Studies) are considered equivalent for usefulness, the follicle studies gives more information.  If you are having the US done it isn't that important to get the blood test done.  If she prefers to have it done for some reason I can put the order in.\"    "

## 2023-04-19 NOTE — TELEPHONE ENCOUNTER
Pt would like to do the follicle studies on days #11, #13 and #15.      Routing to Dr. Vazquez to place the appropriate orders so she can scheduled.  Today is day #1 of her cycle.    Mary Carrero RN

## 2023-04-20 ENCOUNTER — PATIENT OUTREACH (OUTPATIENT)
Dept: CARE COORDINATION | Facility: CLINIC | Age: 28
End: 2023-04-20
Payer: COMMERCIAL

## 2023-04-23 ENCOUNTER — HEALTH MAINTENANCE LETTER (OUTPATIENT)
Age: 28
End: 2023-04-23

## 2023-04-24 NOTE — TELEPHONE ENCOUNTER
Pt is reaching back out to see if the follicle studies orders have been placed yet.  I do not see that they have.  Today is pt's day #6 so she would like to schedule the follicle studies for Saturday and next week.    Routing to Dr. Vazquez again to place the follicle study orders for pt to do the follicle studies on day 11, 13 and 15.    Mary Carrero RN

## 2023-05-02 NOTE — TELEPHONE ENCOUNTER
Garrett Vazquez MD  You; [facility] Triage Just now (1:40 PM)     DB  I would suggest she have all three scans with the next cycle.   If she wants we can do a day 20 progesterone level that should also indicate if she ovulated.   I'll put in the order if you want to let her know.   Thanks   DB

## 2023-05-02 NOTE — TELEPHONE ENCOUNTER
Pt is already on day #14 of this cycle. She is wondering if she should schedule a follicle study for tomorrow, day #15, since the orders weren't placed in time for her to do the day #11 and #13 follicle studies.    Routing to Dr. Vazquez to see if at this point since she is on day 14 of this cycle after taking the letrozole, should she do one follicle study tomorrow, day #15, or should she do a day #21 progesterone level instead?  Pt is scheduled for an OV with Dr. Vazquez on day #28 of her cycle.    Mary Carrero RN

## 2023-05-02 NOTE — TELEPHONE ENCOUNTER
"Garrett Vazquez MD Netland, Heidi, RN  Cc: [facility] Triage  Phone Number: [phone]     \"In the past I have been able to order Follicle Studies by placing the order request as just \"Follicle Study\".   Nothing comes up at this point for an order so I put in \"pelvic US\" and specified for evaluation of ovaries only and ordered 3 of the ultrasounds.  Could someone review this and see if this will be acceptable?  I had for Saurav to get to the US and finding that it isn't going to work.\"     Spoke with an US tech from [employer]. She states that for the initial follicle study (day #11) the \"US Pelvic Complete w Transvaginal follicular initial\" needs to be ordered.  Then for day's #13 and #15, \"US pelvic limited\" needs to be ordered.    Pended the 2 US orders that need to be signed in order for pt to schedule the follicle studies.  Routing to Dr. Vazquez to sign.    Mary Carrero RN    "

## 2023-05-09 ENCOUNTER — LAB (OUTPATIENT)
Dept: LAB | Facility: CLINIC | Age: 28
End: 2023-05-09
Payer: COMMERCIAL

## 2023-05-09 DIAGNOSIS — N97.9 FEMALE INFERTILITY: ICD-10-CM

## 2023-05-09 DIAGNOSIS — N97.9 FEMALE INFERTILITY: Primary | ICD-10-CM

## 2023-05-09 PROCEDURE — 36415 COLL VENOUS BLD VENIPUNCTURE: CPT

## 2023-05-09 PROCEDURE — 84144 ASSAY OF PROGESTERONE: CPT

## 2023-05-10 LAB — PROGEST SERPL-MCNC: 10.9 NG/ML

## 2023-05-16 ENCOUNTER — OFFICE VISIT (OUTPATIENT)
Dept: OBGYN | Facility: OTHER | Age: 28
End: 2023-05-16
Payer: COMMERCIAL

## 2023-05-16 VITALS
HEART RATE: 86 BPM | BODY MASS INDEX: 33.45 KG/M2 | DIASTOLIC BLOOD PRESSURE: 68 MMHG | WEIGHT: 194.89 LBS | SYSTOLIC BLOOD PRESSURE: 103 MMHG

## 2023-05-16 DIAGNOSIS — O26.91 COMPLICATION OF PREGNANCY, ANTEPARTUM, FIRST TRIMESTER: Primary | ICD-10-CM

## 2023-05-16 PROCEDURE — 99213 OFFICE O/P EST LOW 20 MIN: CPT | Performed by: OBSTETRICS & GYNECOLOGY

## 2023-05-16 RX ORDER — SUMATRIPTAN 25 MG/1
TABLET, FILM COATED ORAL
COMMUNITY
Start: 2022-06-04

## 2023-05-16 NOTE — PATIENT INSTRUCTIONS
If you have any questions regarding your visit, Please contact your care team.     [employer]: [phone]    To Schedule an Appointment 24/7  Call: [phone] HOURS TELEPHONE NUMBER     Garrett Vazquez MD    Medical Assistant    Nilesh Ramires-Surgery Scheduler  Jazmine-Surgery Scheduler     Monday-[place]  1:00 pm-4:30 pm    Tuesday-[place]  7:30 am-4:30 pm    Wednesday-[place]  7:30 am-4:30 pm        Typical Surgery Days: Monday or Thursday       [address] [address]  [phone] appointment line  [phone] Fax    Imaging Scheduling all locations  [phone]    **Surgeries** Our Surgery Schedulers will contact you to schedule. If you do not receive a call within 3 business days, please call [phone].    If you need a medication refill, please contact your pharmacy. Please allow 3 business days for your refill to be completed.    As always, Thank you for trusting us with your healthcare needs!                   see additional instructions from your care team below

## 2023-05-16 NOTE — PROGRESS NOTES
SUBJECTIVE:      I spent a total of 20 minutes on the care of Saurav on the date of service including 15 minutes of face-to-face time with the remainder in chart review, care coordination, documentation on the day of service.                                                 Jackelin Sal is a 27 year old female who presents to clinic today for the following health issue(s):  Newly pregnant    Saurav is a 27-year-old  last menstrual period April 19 presents to clinic today with a confirmation of pregnancy approximately 2 days ago.  She is a patient that has been on 1 month of letrozole and conceived with that.  She has a history of polycystic ovaries that is documented in more than 1 setting.  She also has had a miscarriage 6 months ago.  She has a couple questions regarding progesterone and polycystic ovaries and those were addressed and one of the questions has been directed.  [facility]-regarding progesterone level in pregnancy and whether that should be followed with lab tests.  I also suggest that she may be a good candidate for progesterone suppositories or or oral during pregnancy because of her miscarriage and the polycystic ovaries.  Questions have been addressed to M.    We reviewed a number of things to avoid in early pregnancy including excessive caffeine excessive sea food etc.    Currently she is taking prenatal vitamins she feels well maybe a little bit of nausea and we reviewed a variety of different remedies for that that do not involve medications.    We also talked about following a hCG level which has been ordered on 2 occasions for 48-hour intervals.  Also ordering a follow-up ultrasound for approximately 6 weeks from her last menstrual period.    Examination is deferred    Assessment:  Early pregnancy in a patient that has had an early loss and no doubt raises the level of concern plus with a history of polycystic ovaries.    Plan:  As above we will consider vaginal suppositories of progesterone  we will also ask if progesterone levels need to be followed as noted hCG levels and ultrasound are pending    Patient's last menstrual period was 03/15/2023..     Patient is sexually active, .  Using none for contraception.    reports that she has never smoked. She has never used smokeless tobacco.    STD testing offered?  Declined    Health maintenance updated:  no    Today's PHQ-2 Score:     3/20/2023     3:00 PM   PHQ-2 (  [employer])   Q1: Little interest or pleasure in doing things 0   Q2: Feeling down, depressed or hopeless 1   PHQ-2 Score 1     Today's PHQ-9 Score:       2021     7:42 AM   PHQ-9 SCORE   PHQ-9 Total Score 6     Today's MORGAN-7 Score:       2021     7:42 AM   MORGAN-7 SCORE   Total Score 4       Problem list and histories reviewed & adjusted, as indicated.  Additional history: as documented.    Patient Active Problem List   Diagnosis     Family history of cardiac arrhythmia     CARDIOVASCULAR SCREENING; LDL GOAL LESS THAN 160     Anxiety     PCOS (polycystic ovarian syndrome)     Past Surgical History:   Procedure Laterality Date     WISDOM TOOTH EXTRACTION        Social History     Tobacco Use     Smoking status: Never     Smokeless tobacco: Never   Vaping Use     Vaping status: Not on file   Substance Use Topics     Alcohol use: Yes      Problem (# of Occurrences) Relation (Name,Age of Onset)    Anxiety Disorder (2) Mother (Renetta), Father (Garrett)    Depression (1) Mother (Renetta)    Heart Disease (1) Father (Garrett): wpw    Hypertension (3) Father (Garrett), Maternal Grandfather, Paternal Grandfather    Thyroid Disease (1) Maternal Grandmother    Jamar Parkinson White syndrome (1) Father (Garrett)    Hyperlipidemia (1) Paternal Grandmother    Deep Vein Thrombosis (2) Maternal Grandfather (40), Maternal Aunt (40): has genetic mutation for clotting    Hyperthyroidism (1) Mother (Renetta)    Hypothyroidism (1) Maternal Aunt    Peripheral Vascular Disease (1) Maternal Grandmother    No Known  Problems (1) Brother (Artemio)       Negative family history of: C.A.D., Breast Cancer            Current Outpatient Medications   Medication Sig     letrozole (FEMARA) 2.5 MG tablet Take 2 tablets (5 mg) orally each day from day 3 to 7     Prenatal Vit-Fe Fumarate-FA (PRENATAL MULTIVITAMIN W/IRON) 27-0.8 MG tablet Take 1 tablet by mouth daily     UNABLE TO FIND MEDICATION NAME: myto-inositol     No current facility-administered medications for this visit.     No Known Allergies      OBJECTIVE:     LMP 03/15/2023   There is no height or weight on file to calculate BMI.    Exam:  Deferred    In-Clinic Test Results:  No results found for this or any previous visit (from the past 24 hour(s)).    ASSESSMENT/PLAN:                                                      Early pregnancy in a patient with a history of 1 miscarriage and polycystic ovaries.    Patient Instructions                                                        If you have any questions regarding your visit, Please contact your care team.     [employer]: [phone]    To Schedule an Appointment 24/7  Call: [phone] HOURS TELEPHONE NUMBER     Garrett Vazquez MD    Medical Assistant    Nilesh Hernandez-EDWARD Ramires-Surgery Scheduler  Jazmine-Surgery Scheduler     [place]  1:00 pm-4:30 pm    [facility]30 [facility]    [facility]  7:30 am-4:30 pm        Typical Surgery Days: Monday or Thursday       [address] [address]  [phone] appointment line  [phone] Fax    Imaging Scheduling all locations  [phone]    **Surgeries** Our Surgery Schedulers will contact you to schedule. If you do not receive a call within 3 business days, please call [phone].    If you need a medication refill, please contact your pharmacy. Please allow 3 business days for your refill to be completed.    As always, Thank you for  trusting us with your healthcare needs!                   see additional instructions from your care team below            Garrett Vazquez MD  [facility]

## 2023-05-22 ENCOUNTER — LAB (OUTPATIENT)
Dept: LAB | Facility: CLINIC | Age: 28
End: 2023-05-22
Payer: COMMERCIAL

## 2023-05-22 DIAGNOSIS — O26.91 COMPLICATION OF PREGNANCY, ANTEPARTUM, FIRST TRIMESTER: ICD-10-CM

## 2023-05-22 LAB — HCG INTACT+B SERPL-ACNC: 746 MIU/ML

## 2023-05-22 PROCEDURE — 36415 COLL VENOUS BLD VENIPUNCTURE: CPT

## 2023-05-22 PROCEDURE — 84702 CHORIONIC GONADOTROPIN TEST: CPT

## 2023-05-24 ENCOUNTER — LAB (OUTPATIENT)
Dept: LAB | Facility: CLINIC | Age: 28
End: 2023-05-24
Payer: COMMERCIAL

## 2023-05-24 DIAGNOSIS — O26.91 COMPLICATION OF PREGNANCY, ANTEPARTUM, FIRST TRIMESTER: ICD-10-CM

## 2023-05-24 LAB — HCG INTACT+B SERPL-ACNC: 1768 MIU/ML

## 2023-05-24 PROCEDURE — 84702 CHORIONIC GONADOTROPIN TEST: CPT

## 2023-05-24 PROCEDURE — 36415 COLL VENOUS BLD VENIPUNCTURE: CPT

## 2023-06-01 ENCOUNTER — HOSPITAL ENCOUNTER (OUTPATIENT)
Dept: ULTRASOUND IMAGING | Facility: CLINIC | Age: 28
Discharge: HOME | End: 2023-06-01
Attending: OBSTETRICS & GYNECOLOGY | Admitting: OBSTETRICS & GYNECOLOGY
Payer: COMMERCIAL

## 2023-06-01 ENCOUNTER — ANCILLARY ORDERS (OUTPATIENT)
Dept: OBGYN | Facility: OTHER | Age: 28
End: 2023-06-01

## 2023-06-01 DIAGNOSIS — O26.91 COMPLICATION OF PREGNANCY, ANTEPARTUM, FIRST TRIMESTER: ICD-10-CM

## 2023-06-01 PROCEDURE — 76801 OB US < 14 WKS SINGLE FETUS: CPT

## 2023-06-06 ENCOUNTER — VIRTUAL VISIT (OUTPATIENT)
Dept: FAMILY MEDICINE | Facility: CLINIC | Age: 28
End: 2023-06-06
Payer: COMMERCIAL

## 2023-06-06 DIAGNOSIS — Z34.90 SUPERVISION OF NORMAL PREGNANCY: Primary | ICD-10-CM

## 2023-06-06 PROCEDURE — 99207 PR NO CHARGE NURSE ONLY: CPT

## 2023-06-21 NOTE — PATIENT INSTRUCTIONS
You have been provided the CDC Warning Signs in Pregnancy document.    Additional copies can be found here: [email or web address]                                                       If you have any questions regarding your visit, Please contact your care team.     [employer] Services: [phone]    To Schedule an Appointment 24/7  Call: [phone] HOURS TELEPHONE NUMBER     Garrett Vazquez MD    Medical Assistant    Nilesh Ramires-Surgery Scheduler  Jazmine-Surgery Scheduler     Monday-[place]  1:00 pm-4:30 pm    Tuesday-[place]  7:30 am-4:30 pm    Wednesday-[place]  7:30 am-4:30 pm        Typical Surgery Days: Monday or Thursday       [address] [address]  [phone] appointment line  [phone] Fax    Imaging Scheduling all locations  [phone]    **Surgeries** Our Surgery Schedulers will contact you to schedule. If you do not receive a call within 3 business days, please call [phone].    If you need a medication refill, please contact your pharmacy. Please allow 3 business days for your refill to be completed.    As always, Thank you for trusting us with your healthcare needs!                   see additional instructions from your care team below

## 2023-06-21 NOTE — PROGRESS NOTES
SUBJECTIVE:     HPI:    This is a 27 year old female patient,  who presents for her first obstetrical visit.    THAI: 2024, by Last Menstrual Period.  She is 9w0d weeks.  Her cycles are regular.  Her last menstrual period was normal.   Since her LMP, she has experienced  fatigue and loss of appetite).   She denies vaginal bleeding.    Additional History: Had early loss last summer    Have you travelled during the pregnancy?No  Have your sexual partner(s) travelled during the pregnancy?No      HISTORY:   Planned Pregnancy: Yes  Marital Status:   Occupation: teacher  Living in Household: Alone    Past History:  Her past medical history is non-contributory.      She has a history of early loss with first pregnancy    Since her last LMP she denies use of alcohol, tobacco and street drugs.    Past medical, surgical, social and family history were reviewed and updated in [facility].        Current Outpatient Medications   Medication     letrozole (FEMARA) 2.5 MG tablet     Prenatal Vit-Fe Fumarate-FA (PRENATAL MULTIVITAMIN W/IRON) 27-0.8 MG tablet     SUMAtriptan (IMITREX) 25 MG tablet     UNABLE TO FIND     No current facility-administered medications for this visit.       ROS:   12 point review of systems negative other than symptoms noted below or in the HPI.      OBJECTIVE:     EXAM:  LMP 2023 (Exact Date)  There is no height or weight on file to calculate BMI.    GENERAL: healthy, alert and no distress  EYES: Eyes grossly normal to inspection, PERRL and conjunctivae and sclerae normal  HENT: ear canals and TM's normal, nose and mouth without ulcers or lesions  ABDOMEN: soft, nontender, no hepatosplenomegaly, no masses and bowel sounds normal  MS: no gross musculoskeletal defects noted, no edema  SKIN: no suspicious lesions or rashes  NEURO: Normal strength and tone, mentation intact and speech normal  PSYCH: mentation appears normal, affect normal/bright    Pap smear to be done next  visit    ASSESSMENT/PLAN:     No diagnosis found.    27 year old , 9w0d weeks of pregnancy with THAI of 2024, by Last Menstrual Period    Discussed as follows:nutrition, exercise, rest.  Testing in pregnancy    Counseling given:   - Follow up in 4-6 weeks for return OB visit.  - Recommended weight gain for pregnancy: 15-25 lbs.         PLAN/PATIENT INSTRUCTIONS:  RTC one month  Will do physical at that point  Try  Natural stool soften, consider fiber, decreasing PNV     Garrett Vazquez MD

## 2023-06-27 LAB
ABO/RH(D): NORMAL
ANTIBODY SCREEN: NEGATIVE
SPECIMEN EXPIRATION DATE: NORMAL

## 2023-06-28 ENCOUNTER — LAB (OUTPATIENT)
Dept: LAB | Facility: CLINIC | Age: 28
End: 2023-06-28
Payer: COMMERCIAL

## 2023-06-28 ENCOUNTER — PRENATAL OFFICE VISIT (OUTPATIENT)
Dept: OBGYN | Facility: CLINIC | Age: 28
End: 2023-06-28
Payer: COMMERCIAL

## 2023-06-28 VITALS
WEIGHT: 193.9 LBS | BODY MASS INDEX: 33.28 KG/M2 | DIASTOLIC BLOOD PRESSURE: 72 MMHG | HEART RATE: 94 BPM | SYSTOLIC BLOOD PRESSURE: 112 MMHG

## 2023-06-28 DIAGNOSIS — Z34.90 SUPERVISION OF NORMAL PREGNANCY: ICD-10-CM

## 2023-06-28 DIAGNOSIS — Z12.4 SCREENING FOR MALIGNANT NEOPLASM OF CERVIX: Primary | ICD-10-CM

## 2023-06-28 LAB
ERYTHROCYTE [DISTWIDTH] IN BLOOD BY AUTOMATED COUNT: 12 % (ref 10–15)
HCT VFR BLD AUTO: 36.8 % (ref 35–47)
HGB BLD-MCNC: 12.5 G/DL (ref 11.7–15.7)
MCH RBC QN AUTO: 29.5 PG (ref 26.5–33)
MCHC RBC AUTO-ENTMCNC: 34 G/DL (ref 31.5–36.5)
MCV RBC AUTO: 87 FL (ref 78–100)
PLATELET # BLD AUTO: 236 10E3/UL (ref 150–450)
RBC # BLD AUTO: 4.24 10E6/UL (ref 3.8–5.2)
WBC # BLD AUTO: 7.8 10E3/UL (ref 4–11)

## 2023-06-28 PROCEDURE — 86762 RUBELLA ANTIBODY: CPT

## 2023-06-28 PROCEDURE — 85027 COMPLETE CBC AUTOMATED: CPT

## 2023-06-28 PROCEDURE — 86900 BLOOD TYPING SEROLOGIC ABO: CPT

## 2023-06-28 PROCEDURE — 86803 HEPATITIS C AB TEST: CPT

## 2023-06-28 PROCEDURE — 86901 BLOOD TYPING SEROLOGIC RH(D): CPT

## 2023-06-28 PROCEDURE — 86780 TREPONEMA PALLIDUM: CPT

## 2023-06-28 PROCEDURE — 87340 HEPATITIS B SURFACE AG IA: CPT

## 2023-06-28 PROCEDURE — 36415 COLL VENOUS BLD VENIPUNCTURE: CPT

## 2023-06-28 PROCEDURE — 86850 RBC ANTIBODY SCREEN: CPT

## 2023-06-28 PROCEDURE — 87086 URINE CULTURE/COLONY COUNT: CPT

## 2023-06-28 PROCEDURE — 87389 HIV-1 AG W/HIV-1&-2 AB AG IA: CPT

## 2023-06-28 PROCEDURE — 99212 OFFICE O/P EST SF 10 MIN: CPT | Performed by: OBSTETRICS & GYNECOLOGY

## 2023-06-29 LAB
HBV SURFACE AG SERPL QL IA: NONREACTIVE
HCV AB SERPL QL IA: NONREACTIVE
HIV 1+2 AB+HIV1 P24 AG SERPL QL IA: NONREACTIVE
RUBV IGG SERPL QL IA: 1.24 INDEX
RUBV IGG SERPL QL IA: POSITIVE
T PALLIDUM AB SER QL: NONREACTIVE

## 2023-06-30 LAB — BACTERIA UR CULT: NORMAL

## 2023-07-12 ENCOUNTER — MYC MEDICAL ADVICE (OUTPATIENT)
Dept: OBGYN | Facility: CLINIC | Age: 28
End: 2023-07-12
Payer: COMMERCIAL

## 2023-07-12 ENCOUNTER — TELEPHONE (OUTPATIENT)
Dept: OBGYN | Facility: CLINIC | Age: 28
End: 2023-07-12
Payer: COMMERCIAL

## 2023-07-12 NOTE — TELEPHONE ENCOUNTER
, 12w0d.    Pt was seen for a New Prenatal visit with Dr. Vazquez on . Pt is scheduled for her next prenatal visit with Dr. Vazquez on .    Sending pt a [employer] message asking what kind of genetic testing she is wanting to have done.  First trimester genetic screening is too late at this time.    Mary Carrero RN

## 2023-07-12 NOTE — TELEPHONE ENCOUNTER
Hi Dr Vazquez and team, Saurav is calling and stating that she needs to have Genetic testing labs done. Can she wait for her next ob appt. To discuss or are they time sensitive and should be done sooner? If so please place orders and call pt to discuss . Thank You Leslie

## 2023-07-15 ENCOUNTER — HEALTH MAINTENANCE LETTER (OUTPATIENT)
Age: 28
End: 2023-07-15

## 2023-07-28 NOTE — PATIENT INSTRUCTIONS
If you have any questions regarding your visit, Please contact your care team.     [employer]: [phone]    To Schedule an Appointment 24/7  Call: [phone] [facility] Health HOURS TELEPHONE NUMBER     Garrett Vazquez MD    Medical Assistant    Nilesh Ramires-Surgery Scheduler  Jazmine-Surgery Scheduler     Monday-[place]  1:00 pm-4:30 pm    Tuesday-[place]  7:30 am-4:30 pm    Wednesday-[place]  7:30 am-4:30 pm        Typical Surgery Days: Monday or Thursday       [address] [address]  [phone] appointment line  [phone] Fax    Imaging Scheduling all locations  [phone]    **Surgeries** Our Surgery Schedulers will contact you to schedule. If you do not receive a call within 3 business days, please call [phone].    If you need a medication refill, please contact your pharmacy. Please allow 3 business days for your refill to be completed.    As always, Thank you for trusting us with your healthcare needs!                   see additional instructions from your care team below

## 2023-07-31 ENCOUNTER — TELEPHONE (OUTPATIENT)
Dept: OBGYN | Facility: OTHER | Age: 28
End: 2023-07-31

## 2023-07-31 ENCOUNTER — PRENATAL OFFICE VISIT (OUTPATIENT)
Dept: OBGYN | Facility: CLINIC | Age: 28
End: 2023-07-31
Payer: COMMERCIAL

## 2023-07-31 VITALS
BODY MASS INDEX: 33.42 KG/M2 | WEIGHT: 194.7 LBS | SYSTOLIC BLOOD PRESSURE: 110 MMHG | OXYGEN SATURATION: 98 % | HEART RATE: 81 BPM | DIASTOLIC BLOOD PRESSURE: 65 MMHG

## 2023-07-31 DIAGNOSIS — Z34.92 NORMAL PREGNANCY, SECOND TRIMESTER: Primary | ICD-10-CM

## 2023-07-31 PROCEDURE — 99207 PR PRENATAL VISIT: CPT | Performed by: OBSTETRICS & GYNECOLOGY

## 2023-07-31 NOTE — PROGRESS NOTES
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  Asked about quad screen and genetics for gender.  Quad ordered.  Genetics to be determined.    RTC one month

## 2023-07-31 NOTE — TELEPHONE ENCOUNTER
Unsure of the cost for gender blood test through [employer].  Sending pt a [employer] message advising to call her insurance company to see if the NIPS testing is covered.  Also, suggesting to contact [employer] (company that the NIPS testing is done through) asking cost.    Mary Carrero RN

## 2023-07-31 NOTE — TELEPHONE ENCOUNTER
----- Message from Garrett Vazquez MD sent at 7/31/2023  9:59 AM CDT -----  Regarding: genetics  Hi,    Saurav is 14 weeks and asked if she could have the genetics blood test to find the gender.  Explained this is often found at the time of a 20 week US (like 6 weeks from now) but they still want to have that done if possible.  Also I suggested it may not be covered by insurance.  Could you let me and her know what is available and the cost?  Cost is often a factor in having people wait a little longer.    DB

## 2023-08-31 ENCOUNTER — PRENATAL OFFICE VISIT (OUTPATIENT)
Dept: FAMILY MEDICINE | Facility: CLINIC | Age: 28
End: 2023-08-31
Payer: COMMERCIAL

## 2023-08-31 VITALS
HEIGHT: 65 IN | OXYGEN SATURATION: 100 % | SYSTOLIC BLOOD PRESSURE: 100 MMHG | BODY MASS INDEX: 33.29 KG/M2 | DIASTOLIC BLOOD PRESSURE: 70 MMHG | TEMPERATURE: 98.3 F | HEART RATE: 76 BPM | WEIGHT: 199.8 LBS | RESPIRATION RATE: 20 BRPM

## 2023-08-31 DIAGNOSIS — Z34.82 ENCOUNTER FOR SUPERVISION OF OTHER NORMAL PREGNANCY IN SECOND TRIMESTER: Primary | ICD-10-CM

## 2023-08-31 PROCEDURE — 99207 PR PRENATAL VISIT: CPT | Performed by: FAMILY MEDICINE

## 2023-08-31 ASSESSMENT — PAIN SCALES - GENERAL: PAINLEVEL: NO PAIN (0)

## 2023-08-31 NOTE — PROGRESS NOTES
Concerns today:   Doing well.  No concerns today.  No nausea/vomiting. No heartburn.  No vaginal bleeding, no contractions/severe cramping, no leakage of fluid.  No vaginal discharge. No dysuria  No headache, vision changes, lower extremity swelling, upper abdominal pain, chest pain, shortness of breath.   Establish care with me today  Second trimester anatomy scan entered      Pregnancy Issues   1. none   2.           Prenatal care plan              - prior deliveries: none  -OB Labs of concern: O+    - Rh- pos              - First trimester screening:  Discussed completed              - Second trimester screening:  Discussed completed              - Labor pain management:                - Ped:                - Circumcision if boy:               - BC:               - Breast feeding:                - Covid 19 vaccine:               - influenza vaccine:               - Tdap        Morales Dejesus MD

## 2023-09-07 ENCOUNTER — HOSPITAL ENCOUNTER (OUTPATIENT)
Dept: ULTRASOUND IMAGING | Facility: CLINIC | Age: 28
Discharge: HOME | End: 2023-09-07
Attending: FAMILY MEDICINE | Admitting: FAMILY MEDICINE
Payer: COMMERCIAL

## 2023-09-07 DIAGNOSIS — Z34.82 ENCOUNTER FOR SUPERVISION OF OTHER NORMAL PREGNANCY IN SECOND TRIMESTER: ICD-10-CM

## 2023-09-07 PROCEDURE — 76805 OB US >/= 14 WKS SNGL FETUS: CPT

## 2023-09-24 ENCOUNTER — MYC MEDICAL ADVICE (OUTPATIENT)
Dept: FAMILY MEDICINE | Facility: CLINIC | Age: 28
End: 2023-09-24
Payer: COMMERCIAL

## 2023-09-24 DIAGNOSIS — Z34.82 ENCOUNTER FOR SUPERVISION OF OTHER NORMAL PREGNANCY IN SECOND TRIMESTER: Primary | ICD-10-CM

## 2023-10-04 ENCOUNTER — HOSPITAL ENCOUNTER (OUTPATIENT)
Dept: ULTRASOUND IMAGING | Facility: CLINIC | Age: 28
Discharge: HOME | End: 2023-10-04
Attending: FAMILY MEDICINE | Admitting: FAMILY MEDICINE
Payer: COMMERCIAL

## 2023-10-04 DIAGNOSIS — Z34.82 ENCOUNTER FOR SUPERVISION OF OTHER NORMAL PREGNANCY IN SECOND TRIMESTER: ICD-10-CM

## 2023-10-04 PROCEDURE — 76816 OB US FOLLOW-UP PER FETUS: CPT

## 2023-10-05 ENCOUNTER — PRENATAL OFFICE VISIT (OUTPATIENT)
Dept: FAMILY MEDICINE | Facility: CLINIC | Age: 28
End: 2023-10-05
Payer: COMMERCIAL

## 2023-10-05 VITALS
HEART RATE: 89 BPM | TEMPERATURE: 98.2 F | WEIGHT: 212 LBS | OXYGEN SATURATION: 99 % | RESPIRATION RATE: 18 BRPM | HEIGHT: 65 IN | SYSTOLIC BLOOD PRESSURE: 112 MMHG | DIASTOLIC BLOOD PRESSURE: 62 MMHG | BODY MASS INDEX: 35.32 KG/M2

## 2023-10-05 DIAGNOSIS — Z34.82 ENCOUNTER FOR SUPERVISION OF OTHER NORMAL PREGNANCY IN SECOND TRIMESTER: Primary | ICD-10-CM

## 2023-10-05 PROCEDURE — 99207 PR PRENATAL VISIT: CPT | Performed by: FAMILY MEDICINE

## 2023-10-05 ASSESSMENT — PAIN SCALES - GENERAL: PAINLEVEL: NO PAIN (0)

## 2023-10-05 NOTE — PROGRESS NOTES
Concerns:    Doing well.  No concerns today.  No vaginal bleeding, no contractions, no leakage of fluid  No nausea/vomiting. No heartburn  No vaginal discharge. No dysuria.   No headache, vision changes, lower extremity swelling, upper abdominal pain, chest pain, shortness of breath  Tdap planned next visit  Discussed PTL, PROM, and when to call or come in.  Normal anatomy ultrasound.  RTC 4 weeks.       Pregnancy Issues              1. Due for pap after delivery                                    Prenatal care plan              - prior deliveries: none  -OB Labs of concern: O+                      - Rh- pos              - First trimester screening:  Discussed completed              - Second trimester screening:  Discussed completed              - Labor pain management:                - Ped:                - Circumcision if boy:               - BC:               - Breast feeding:                - Covid 19 vaccine: discussed              - influenza vaccine: discussed              - Tdap       Morales Dejesus MD

## 2023-10-17 ENCOUNTER — E-VISIT (OUTPATIENT)
Dept: URGENT CARE | Facility: CLINIC | Age: 28
End: 2023-10-17
Payer: COMMERCIAL

## 2023-10-17 ENCOUNTER — MYC MEDICAL ADVICE (OUTPATIENT)
Dept: FAMILY MEDICINE | Facility: CLINIC | Age: 28
End: 2023-10-17
Payer: COMMERCIAL

## 2023-10-17 DIAGNOSIS — N89.8 VAGINAL DISCHARGE: ICD-10-CM

## 2023-10-17 DIAGNOSIS — N89.8 VAGINAL DISCHARGE: Primary | ICD-10-CM

## 2023-10-17 PROCEDURE — 87210 SMEAR WET MOUNT SALINE/INK: CPT | Performed by: FAMILY MEDICINE

## 2023-10-17 PROCEDURE — 99421 OL DIG E/M SVC 5-10 MIN: CPT | Performed by: EMERGENCY MEDICINE

## 2023-10-17 NOTE — TELEPHONE ENCOUNTER
Note: pregnant with vaginal discharge. Wet prep ordered.      Provider E-Visit time total (minutes): 3

## 2023-10-30 ENCOUNTER — HOSPITAL ENCOUNTER (OUTPATIENT)
Facility: CLINIC | Age: 28
Discharge: HOME | End: 2023-10-30
Attending: FAMILY MEDICINE | Admitting: FAMILY MEDICINE
Payer: COMMERCIAL

## 2023-10-30 VITALS
OXYGEN SATURATION: 97 % | RESPIRATION RATE: 16 BRPM | TEMPERATURE: 98.2 F | DIASTOLIC BLOOD PRESSURE: 55 MMHG | SYSTOLIC BLOOD PRESSURE: 100 MMHG

## 2023-10-30 LAB
ALBUMIN UR-MCNC: NEGATIVE MG/DL
APPEARANCE UR: CLEAR
BACTERIA #/AREA URNS HPF: ABNORMAL /HPF
BILIRUB UR QL STRIP: NEGATIVE
CLUE CELLS: ABNORMAL
COLOR UR AUTO: COLORLESS
CRYSTALS AMN MICRO: NORMAL
GLUCOSE UR STRIP-MCNC: NEGATIVE MG/DL
HGB UR QL STRIP: NEGATIVE
KETONES UR STRIP-MCNC: NEGATIVE MG/DL
LEUKOCYTE ESTERASE UR QL STRIP: NEGATIVE
MUCOUS THREADS #/AREA URNS LPF: PRESENT /LPF
NITRATE UR QL: NEGATIVE
PH UR STRIP: 7 [PH] (ref 5–7)
RBC URINE: 0 /HPF
SP GR UR STRIP: 1 (ref 1–1.03)
SQUAMOUS EPITHELIAL: <1 /HPF
TRICHOMONAS, WET PREP: ABNORMAL
UROBILINOGEN UR STRIP-MCNC: NORMAL MG/DL
WBC URINE: <1 /HPF
WBC'S/HIGH POWER FIELD, WET PREP: ABNORMAL
YEAST, WET PREP: ABNORMAL

## 2023-10-30 PROCEDURE — 81001 URINALYSIS AUTO W/SCOPE: CPT | Performed by: FAMILY MEDICINE

## 2023-10-30 PROCEDURE — 87210 SMEAR WET MOUNT SALINE/INK: CPT | Performed by: FAMILY MEDICINE

## 2023-10-30 PROCEDURE — G0463 HOSPITAL OUTPT CLINIC VISIT: HCPCS

## 2023-10-30 RX ORDER — LIDOCAINE 40 MG/G
CREAM TOPICAL
Status: DISCONTINUED | OUTPATIENT
Start: 2023-10-30 | End: 2023-10-30 | Stop reason: HOSPADM

## 2023-10-30 ASSESSMENT — ACTIVITIES OF DAILY LIVING (ADL)
ADLS_ACUITY_SCORE: 35
ADLS_ACUITY_SCORE: 35

## 2023-10-30 NOTE — PROGRESS NOTES
S: Triage Arrival  B: Jackelin is a 27 y.o.  @ 27w 5d who presents with complaint of vaginal discharge and back pain  A: EFM applied. FHT's 145 with moderate variability, accels present, no decels noted on strip. Contractions none  R: Will notify MD to obtain further orders.

## 2023-10-30 NOTE — PROGRESS NOTES
MD bedside with patient. Negative UA and wet prep. Plan for FERN test to R/O labor, if negative patient may discharge home.

## 2023-10-31 ENCOUNTER — MYC MEDICAL ADVICE (OUTPATIENT)
Dept: FAMILY MEDICINE | Facility: CLINIC | Age: 28
End: 2023-10-31
Payer: COMMERCIAL

## 2023-10-31 DIAGNOSIS — Z34.82 ENCOUNTER FOR SUPERVISION OF OTHER NORMAL PREGNANCY IN SECOND TRIMESTER: Primary | ICD-10-CM

## 2023-10-31 NOTE — DISCHARGE INSTRUCTIONS
OB Triage Discharge Instructions    Diet:  Regular diet as tolerated  Drink 8-10 glasses of water and / or juice every day    Activity:  As tolerated    Other Special Instructions: none    Reason for being seen in L&D: Discharge    Treatment/procedures performed in L&D: lab testing and fetal monitoring    Call the [facility] at [phone] if you have:  5 or more contractions in one hour  Leaking of fluid from your vaginal area  Decreased fetal movement (follow kick count instructions)  Bleeding from your vaginal area  Swelling in your face, or increased swelling in your hands or legs  Headaches or vision problems such as blurring or seeing spots or starts  Nausea or vomiting lasting for more than 24 hours  An increase in weight (5lbs/week)  Epigastric pain (sometimes confused with heartburn that does not go away or a very bad stomach ache)    If you have any questions, please follow up with your doctor.        Patient Signature: ______________________________________________________________  By signing the above I acknowledge that a nurse or my care provider has explained the instruction to me and I have had any questions regarding my care explained to me.        Discharge Nurse Signature: _______________________________ 10/30/2023  7:12 PM    Method of discharge: ***    Accompanied by: ***  Time of discharge: ***

## 2023-10-31 NOTE — PROGRESS NOTES
S:  Discharge from triage.   A:  FHT's 140, Moderate variability, Accels present, no decels noted. Contractions none.  Cervical exam cervix closed visually with Ferning collection, no pooling noted  R:  Written and verbal D/C instruction provided. Pt. Verbalized understanding of D/C instructions and will follow up with OB as previously scheduled.   Verbalizes understanding that she will call or return to [facility] with any further questions or concerns.   Pt. D/C'd via ambulation with friend    Nursing Discharge Checklist  Discharge order entered: Yes  Patient care order entered: Yes  Charges entered: Yes  IV start and stop times have been documented in Epic?  NA  NST start and stop times have been documented in Epic Doc F/S?  NA

## 2023-11-02 ENCOUNTER — PRENATAL OFFICE VISIT (OUTPATIENT)
Dept: FAMILY MEDICINE | Facility: CLINIC | Age: 28
End: 2023-11-02
Payer: COMMERCIAL

## 2023-11-02 ENCOUNTER — LAB (OUTPATIENT)
Dept: LAB | Facility: CLINIC | Age: 28
End: 2023-11-02
Payer: COMMERCIAL

## 2023-11-02 VITALS
OXYGEN SATURATION: 99 % | BODY MASS INDEX: 36.29 KG/M2 | DIASTOLIC BLOOD PRESSURE: 58 MMHG | TEMPERATURE: 97.5 F | HEIGHT: 65 IN | RESPIRATION RATE: 12 BRPM | HEART RATE: 94 BPM | SYSTOLIC BLOOD PRESSURE: 100 MMHG | WEIGHT: 217.8 LBS

## 2023-11-02 DIAGNOSIS — Z34.82 ENCOUNTER FOR SUPERVISION OF OTHER NORMAL PREGNANCY IN SECOND TRIMESTER: ICD-10-CM

## 2023-11-02 DIAGNOSIS — Z34.03 ENCOUNTER FOR SUPERVISION OF NORMAL FIRST PREGNANCY IN THIRD TRIMESTER: Primary | ICD-10-CM

## 2023-11-02 LAB — GLUCOSE 1H P 50 G GLC PO SERPL-MCNC: 132 MG/DL (ref 70–129)

## 2023-11-02 PROCEDURE — 90472 IMMUNIZATION ADMIN EACH ADD: CPT | Performed by: FAMILY MEDICINE

## 2023-11-02 PROCEDURE — 36415 COLL VENOUS BLD VENIPUNCTURE: CPT

## 2023-11-02 PROCEDURE — 90686 IIV4 VACC NO PRSV 0.5 ML IM: CPT | Performed by: FAMILY MEDICINE

## 2023-11-02 PROCEDURE — 90715 TDAP VACCINE 7 YRS/> IM: CPT | Performed by: FAMILY MEDICINE

## 2023-11-02 PROCEDURE — 82950 GLUCOSE TEST: CPT

## 2023-11-02 PROCEDURE — 99207 PR PRENATAL VISIT: CPT | Performed by: FAMILY MEDICINE

## 2023-11-02 PROCEDURE — 90471 IMMUNIZATION ADMIN: CPT | Performed by: FAMILY MEDICINE

## 2023-11-02 ASSESSMENT — PAIN SCALES - GENERAL: PAINLEVEL: NO PAIN (0)

## 2023-11-02 NOTE — NURSING NOTE
Prior to immunization administration, verified patients identity using patient s name and date of birth. Please see Immunization Activity for additional information.     Screening Questionnaire for Adult Immunization    Are you sick today?   No   Do you have allergies to medications, food, a vaccine component or latex?   No   Have you ever had a serious reaction after receiving a vaccination?   No   Do you have a long-term health problem with heart, lung, kidney, or metabolic disease (e.g., diabetes), asthma, a blood disorder, no spleen, complement component deficiency, a cochlear implant, or a spinal fluid leak?  Are you on long-term aspirin therapy?   No   Do you have cancer, leukemia, HIV/AIDS, or any other immune system problem?   No   Do you have a parent, brother, or sister with an immune system problem?   No   In the past 3 months, have you taken medications that affect  your immune system, such as prednisone, other steroids, or anticancer drugs; drugs for the treatment of rheumatoid arthritis, Crohn s disease, or psoriasis; or have you had radiation treatments?   No   Have you had a seizure, or a brain or other nervous system problem?   No   During the past year, have you received a transfusion of blood or blood    products, or been given immune (gamma) globulin or antiviral drug?   No   For women: Are you pregnant or is there a chance you could become       pregnant during the next month?   Yes   Have you received any vaccinations in the past 4 weeks?   No     Immunization questionnaire was positive for at least one answer.  Notified Oleg.      Patient instructed to remain in clinic for 15 minutes afterwards, and to report any adverse reactions.     Screening performed by Ying Elias MA on 11/2/2023 at 3:29 PM.

## 2023-11-02 NOTE — PROGRESS NOTES
Concerns:    Doing well.  No concerns today.  No vaginal bleeding, loss of fluid, or contractions  Tdap given today  Discussed kick counts and fetal movement.  Discussed PTL, PROM, and when to call or come in.  RTC in 2 weeks.    Pregnancy Issues              1. Due for pap after delivery   2. Failed 1 hr. Due for 3 hr.                   Prenatal care plan              - prior deliveries: none  -OB Labs of concern: O+                      - Rh- pos              - First trimester screening:  Discussed completed              - Second trimester screening:  Discussed completed              - Labor pain management:                - Ped:                - Circumcision if boy:               - BC:               - Breast feeding:                - Covid 19 vaccine: discussed              - influenza vaccine: done              - Tdap done      Morales Dejesus MD

## 2023-11-09 ENCOUNTER — LAB (OUTPATIENT)
Dept: LAB | Facility: CLINIC | Age: 28
End: 2023-11-09
Payer: COMMERCIAL

## 2023-11-09 DIAGNOSIS — Z34.03 ENCOUNTER FOR SUPERVISION OF NORMAL FIRST PREGNANCY IN THIRD TRIMESTER: ICD-10-CM

## 2023-11-09 LAB
GESTATIONAL GTT 1 HR POST DOSE: 163 MG/DL (ref 60–179)
GESTATIONAL GTT 2 HR POST DOSE: 134 MG/DL (ref 60–154)
GESTATIONAL GTT 3 HR POST DOSE: 110 MG/DL (ref 60–139)
GLUCOSE P FAST SERPL-MCNC: 94 MG/DL (ref 60–94)
HGB BLD-MCNC: 11.4 G/DL (ref 11.7–15.7)
T PALLIDUM AB SER QL: NONREACTIVE

## 2023-11-09 PROCEDURE — 82951 GLUCOSE TOLERANCE TEST (GTT): CPT

## 2023-11-09 PROCEDURE — 36415 COLL VENOUS BLD VENIPUNCTURE: CPT

## 2023-11-09 PROCEDURE — 86780 TREPONEMA PALLIDUM: CPT

## 2023-11-09 PROCEDURE — 82952 GTT-ADDED SAMPLES: CPT

## 2023-11-15 ENCOUNTER — PRENATAL OFFICE VISIT (OUTPATIENT)
Dept: FAMILY MEDICINE | Facility: CLINIC | Age: 28
End: 2023-11-15
Payer: COMMERCIAL

## 2023-11-15 ENCOUNTER — HOSPITAL ENCOUNTER (OUTPATIENT)
Dept: ULTRASOUND IMAGING | Facility: CLINIC | Age: 28
Discharge: HOME | End: 2023-11-15
Attending: FAMILY MEDICINE | Admitting: FAMILY MEDICINE
Payer: COMMERCIAL

## 2023-11-15 VITALS
HEIGHT: 65 IN | DIASTOLIC BLOOD PRESSURE: 56 MMHG | TEMPERATURE: 98 F | SYSTOLIC BLOOD PRESSURE: 90 MMHG | BODY MASS INDEX: 35.49 KG/M2 | WEIGHT: 213 LBS | RESPIRATION RATE: 12 BRPM | HEART RATE: 86 BPM | OXYGEN SATURATION: 97 %

## 2023-11-15 DIAGNOSIS — Z34.90 NORMAL PREGNANCY, ANTEPARTUM: Primary | ICD-10-CM

## 2023-11-15 DIAGNOSIS — Z34.82 ENCOUNTER FOR SUPERVISION OF OTHER NORMAL PREGNANCY IN SECOND TRIMESTER: ICD-10-CM

## 2023-11-15 PROCEDURE — 99207 PR PRENATAL VISIT: CPT | Performed by: FAMILY MEDICINE

## 2023-11-15 PROCEDURE — 76816 OB US FOLLOW-UP PER FETUS: CPT

## 2023-11-15 ASSESSMENT — PAIN SCALES - GENERAL: PAINLEVEL: NO PAIN (0)

## 2023-11-30 ENCOUNTER — PRENATAL OFFICE VISIT (OUTPATIENT)
Dept: FAMILY MEDICINE | Facility: CLINIC | Age: 28
End: 2023-11-30
Payer: COMMERCIAL

## 2023-11-30 VITALS
OXYGEN SATURATION: 97 % | TEMPERATURE: 98.1 F | DIASTOLIC BLOOD PRESSURE: 58 MMHG | RESPIRATION RATE: 18 BRPM | HEIGHT: 65 IN | BODY MASS INDEX: 36.35 KG/M2 | WEIGHT: 218.2 LBS | HEART RATE: 107 BPM | SYSTOLIC BLOOD PRESSURE: 100 MMHG

## 2023-11-30 DIAGNOSIS — Z34.90 NORMAL PREGNANCY, ANTEPARTUM: Primary | ICD-10-CM

## 2023-11-30 PROCEDURE — 99207 PR PRENATAL VISIT: CPT | Performed by: FAMILY MEDICINE

## 2023-11-30 ASSESSMENT — PAIN SCALES - GENERAL: PAINLEVEL: MODERATE PAIN (5)

## 2023-11-30 NOTE — PROGRESS NOTES
Concerns:   Doing well.  No concerns today.  No vaginal bleeding, LOF.  No contractions.  Discussed kick counts and fetal movement.  Discussed PTL, PROM, and when to call or come in.  RTC 2 weeks.      Pregnancy Issues              1. Due for pap after delivery              2. Failed 1 hr. passed 3 hr.              3. Anxiety. Self-reyna              4. Inadequate views. Rpt US 32w. DONE. nl                   Prenatal care plan              - prior deliveries: none  -OB Labs of concern: O+                      - Rh- pos              - First trimester screening:  Discussed completed              - Second trimester screening:  Discussed completed              - Labor pain management:                - Ped:                - Circumcision if boy:               - BC:               - Breast feeding:                - Covid 19 vaccine: discussed              - influenza vaccine: done              - Tdap done              - RSV __considering_      Morales Dejesus MD

## 2023-12-14 ENCOUNTER — PRENATAL OFFICE VISIT (OUTPATIENT)
Dept: FAMILY MEDICINE | Facility: CLINIC | Age: 28
End: 2023-12-14
Payer: COMMERCIAL

## 2023-12-14 VITALS
BODY MASS INDEX: 36.71 KG/M2 | OXYGEN SATURATION: 98 % | TEMPERATURE: 98.7 F | RESPIRATION RATE: 16 BRPM | WEIGHT: 217.25 LBS | HEART RATE: 93 BPM | SYSTOLIC BLOOD PRESSURE: 104 MMHG | DIASTOLIC BLOOD PRESSURE: 68 MMHG

## 2023-12-14 DIAGNOSIS — Z34.90 NORMAL PREGNANCY, ANTEPARTUM: Primary | ICD-10-CM

## 2023-12-14 PROCEDURE — 99207 PR PRENATAL VISIT: CPT | Performed by: FAMILY MEDICINE

## 2023-12-14 PROCEDURE — 90471 IMMUNIZATION ADMIN: CPT | Performed by: FAMILY MEDICINE

## 2023-12-14 PROCEDURE — 90678 RSV VACC PREF BIVALENT IM: CPT | Performed by: FAMILY MEDICINE

## 2023-12-14 ASSESSMENT — PAIN SCALES - GENERAL: PAINLEVEL: MODERATE PAIN (5)

## 2023-12-14 NOTE — NURSING NOTE
Prior to immunization administration, verified patients identity using patient s name and date of birth. Please see Immunization Activity for additional information.     Screening Questionnaire for Adult Immunization    Are you sick today?   No   Do you have allergies to medications, food, a vaccine component or latex?   No   Have you ever had a serious reaction after receiving a vaccination?   No   Do you have a long-term health problem with heart, lung, kidney, or metabolic disease (e.g., diabetes), asthma, a blood disorder, no spleen, complement component deficiency, a cochlear implant, or a spinal fluid leak?  Are you on long-term aspirin therapy?   No   Do you have cancer, leukemia, HIV/AIDS, or any other immune system problem?   No   Do you have a parent, brother, or sister with an immune system problem?   No   In the past 3 months, have you taken medications that affect  your immune system, such as prednisone, other steroids, or anticancer drugs; drugs for the treatment of rheumatoid arthritis, Crohn s disease, or psoriasis; or have you had radiation treatments?   No   Have you had a seizure, or a brain or other nervous system problem?   No   During the past year, have you received a transfusion of blood or blood    products, or been given immune (gamma) globulin or antiviral drug?   No   For women: Are you pregnant or is there a chance you could become       pregnant during the next month?   Yes   Have you received any vaccinations in the past 4 weeks?   No     Immunization questionnaire was positive for at least one answer.  Notified Dr. Dejesus .      Patient instructed to remain in clinic for 15 minutes afterwards, and to report any adverse reactions.     Screening performed by Lizeth Howe CMA on 12/14/2023 at 8:15 AM.

## 2023-12-14 NOTE — PROGRESS NOTES
Concerns:   Doing well.  No concerns today.  No vaginal bleeding, LOF.  No contractions.  Discussed kick counts and fetal movement.  Discussed PTL, PROM, and when to call or come in.  RTC 2 weeks.  Noting 2 wks of congestion  Home bp rising, 120s. No ha, ruq pain, scotoma. Noting more LE swelling      Pregnancy Issues              1. Due for pap after delivery              2. Failed 1 hr. passed 3 hr.              3. Anxiety. Self-reyna              4. Inadequate views. Rpt US 32w. DONE. nl                   Prenatal care plan              - prior deliveries: none  -OB Labs of concern: O+                      - Rh- pos              - First trimester screening:  Discussed completed              - Second trimester screening:  Discussed completed              - Labor pain management:                - Ped:                - Circumcision if boy:               - BC:               - Breast feeding:                - Covid 19 vaccine: discussed              - influenza vaccine: done              - Tdap done              - RSV __considering_  Morales Dejesus MD

## 2023-12-28 ENCOUNTER — PRENATAL OFFICE VISIT (OUTPATIENT)
Dept: FAMILY MEDICINE | Facility: CLINIC | Age: 28
End: 2023-12-28
Payer: COMMERCIAL

## 2023-12-28 VITALS
DIASTOLIC BLOOD PRESSURE: 60 MMHG | SYSTOLIC BLOOD PRESSURE: 116 MMHG | RESPIRATION RATE: 18 BRPM | TEMPERATURE: 98.2 F | OXYGEN SATURATION: 97 % | HEART RATE: 88 BPM | BODY MASS INDEX: 36.96 KG/M2 | HEIGHT: 65 IN | WEIGHT: 221.8 LBS

## 2023-12-28 DIAGNOSIS — Z34.90 NORMAL PREGNANCY, ANTEPARTUM: Primary | ICD-10-CM

## 2023-12-28 PROCEDURE — 99207 PR PRENATAL VISIT: CPT | Performed by: FAMILY MEDICINE

## 2023-12-28 PROCEDURE — 87653 STREP B DNA AMP PROBE: CPT | Performed by: FAMILY MEDICINE

## 2023-12-28 ASSESSMENT — PAIN SCALES - GENERAL: PAINLEVEL: NO PAIN (0)

## 2023-12-28 NOTE — PROGRESS NOTES
Concerns:    .  Doing well.  No concerns today.  No vaginal bleeding, LOF, contractions.  No HA, RUQ pain, N/V, visual changes.  GBS done today.  Labor precautions discussed.  RTC 1 week.  Prenatal flowsheet information is reviewed.      Pregnancy Issues              1. Due for pap after delivery              2. Failed 1 hr. passed 3 hr.              3. Anxiety. Self-reyna              4. Inadequate views. Rpt US 32w. DONE. nl                   Prenatal care plan              - prior deliveries: none  -OB Labs of concern: O+                      - Rh- pos              - First trimester screening:  Discussed completed              - Second trimester screening:  Discussed completed              - Labor pain management:                - Ped:                - Circumcision if boy:               - BC:               - Breast feeding:                - Covid 19 vaccine: 2021, declines booster              - influenza vaccine: done              - Tdap done              - RSV __considering_    Morales Dejesus MD

## 2023-12-29 LAB — GP B STREP DNA SPEC QL NAA+PROBE: NEGATIVE

## 2024-01-02 ENCOUNTER — HOSPITAL ENCOUNTER (OUTPATIENT)
Facility: CLINIC | Age: 29
Discharge: HOME | End: 2024-01-02
Attending: FAMILY MEDICINE | Admitting: FAMILY MEDICINE
Payer: COMMERCIAL

## 2024-01-02 VITALS
DIASTOLIC BLOOD PRESSURE: 66 MMHG | HEART RATE: 97 BPM | TEMPERATURE: 97.6 F | OXYGEN SATURATION: 98 % | RESPIRATION RATE: 18 BRPM | SYSTOLIC BLOOD PRESSURE: 121 MMHG

## 2024-01-02 PROBLEM — Z36.89 ENCOUNTER FOR TRIAGE IN PREGNANT PATIENT: Status: ACTIVE | Noted: 2023-10-30

## 2024-01-02 PROCEDURE — 999N000105 HC STATISTIC NO DOCUMENTATION TO SUPPORT CHARGE

## 2024-01-02 PROCEDURE — 59025 FETAL NON-STRESS TEST: CPT

## 2024-01-02 PROCEDURE — G0463 HOSPITAL OUTPT CLINIC VISIT: HCPCS | Mod: 25

## 2024-01-02 RX ORDER — LIDOCAINE 40 MG/G
CREAM TOPICAL
Status: DISCONTINUED | OUTPATIENT
Start: 2024-01-02 | End: 2024-01-02 | Stop reason: HOSPADM

## 2024-01-02 ASSESSMENT — ACTIVITIES OF DAILY LIVING (ADL): ADLS_ACUITY_SCORE: 20

## 2024-01-02 NOTE — PROGRESS NOTES
MD updated, , moderate variability, accels present, no decels noted. SVE 1cm/posterior. Gigi irregular, 7-10. Plan per MD to discharge home with viral etiology given recent loose stools. Push fluids. Patient agreeable to plan. Reasons reviewed with patient to return to unit.

## 2024-01-02 NOTE — PROGRESS NOTES
S: Triage Arrival  B: Jackelin is a 28 y.o.  @ 36w 6d who presents with complaint of abdominal cramping, back pain, loose stool x3 starting at 2300.  A: EFM applied. FHT's150 with moderate variability, accels present, no decels noted on strip. Contractions irregular  R: Will notify MD to obtain further orders.

## 2024-01-02 NOTE — DISCHARGE INSTRUCTIONS
OB Triage Discharge Instructions    Diet:  Regular diet as tolerated  Drink 8-10 glasses of water and / or juice every day    Activity:  As tolerated        Reason for being seen in L&D: cramping    Treatment/procedures performed in L&D: fetal and maternal monitoring    Call the [facility] at [phone] if you have:  5 or more contractions in one hour  Leaking of fluid from your vaginal area  Decreased fetal movement (follow kick count instructions)  Bleeding from your vaginal area  Swelling in your face, or increased swelling in your hands or legs  Headaches or vision problems such as blurring or seeing spots or starts  Nausea or vomiting lasting for more than 24 hours  An increase in weight (5lbs/week)  Epigastric pain (sometimes confused with heartburn that does not go away or a very bad stomach ache)    If you have any questions, please follow up with your doctor.        Patient Signature: ______________________________________________________________  By signing the above I acknowledge that a nurse or my care provider has explained the instruction to me and I have had any questions regarding my care explained to me.        Discharge Nurse Signature: _______________________________ 1/2/2024  2:41 AM    Method of discharge: ambulation    Accompanied by: self  Time of discharge: 0300

## 2024-01-04 ENCOUNTER — PRENATAL OFFICE VISIT (OUTPATIENT)
Dept: FAMILY MEDICINE | Facility: CLINIC | Age: 29
End: 2024-01-04
Payer: COMMERCIAL

## 2024-01-04 VITALS
DIASTOLIC BLOOD PRESSURE: 72 MMHG | RESPIRATION RATE: 18 BRPM | OXYGEN SATURATION: 97 % | TEMPERATURE: 98.6 F | HEIGHT: 64 IN | SYSTOLIC BLOOD PRESSURE: 118 MMHG | WEIGHT: 222.8 LBS | BODY MASS INDEX: 38.04 KG/M2 | HEART RATE: 91 BPM

## 2024-01-04 DIAGNOSIS — Z34.90 NORMAL PREGNANCY, ANTEPARTUM: Primary | ICD-10-CM

## 2024-01-04 PROCEDURE — 99207 PR PRENATAL VISIT: CPT | Performed by: FAMILY MEDICINE

## 2024-01-04 ASSESSMENT — PAIN SCALES - GENERAL: PAINLEVEL: MODERATE PAIN (5)

## 2024-01-04 NOTE — PROGRESS NOTES
Concerns:    Doing well.  No concerns today.  No vaginal bleeding, LOF, contractions.  No HA, RUQ pain, N/V, visual changes.  Labor precautions discussed.      Pregnancy Issues              1. Due for pap after delivery              2. Failed 1 hr. passed 3 hr.              3. Anxiety. Self-reyna              4. Inadequate views. Rpt US 32w. DONE. nl                   Prenatal care plan              - prior deliveries: none  -OB Labs of concern: O+                      - Rh- pos              - First trimester screening:  Discussed completed              - Second trimester screening:  Discussed completed              - Labor pain management:                - Ped:                - Circumcision if boy:               - BC:               - Breast feeding:                - Covid 19 vaccine: discussed              - influenza vaccine: done              - Tdap done              - RSV declined      RTC 1 week.    Morales Dejesus MD

## 2024-01-11 ENCOUNTER — PRENATAL OFFICE VISIT (OUTPATIENT)
Dept: FAMILY MEDICINE | Facility: CLINIC | Age: 29
End: 2024-01-11
Payer: COMMERCIAL

## 2024-01-11 VITALS
HEART RATE: 87 BPM | HEIGHT: 64 IN | SYSTOLIC BLOOD PRESSURE: 104 MMHG | BODY MASS INDEX: 38.38 KG/M2 | TEMPERATURE: 98.2 F | RESPIRATION RATE: 18 BRPM | WEIGHT: 224.8 LBS | DIASTOLIC BLOOD PRESSURE: 60 MMHG | OXYGEN SATURATION: 98 %

## 2024-01-11 DIAGNOSIS — Z34.90 NORMAL PREGNANCY, ANTEPARTUM: Primary | ICD-10-CM

## 2024-01-11 PROCEDURE — 99207 PR PRENATAL VISIT: CPT | Performed by: FAMILY MEDICINE

## 2024-01-11 ASSESSMENT — PAIN SCALES - GENERAL: PAINLEVEL: MODERATE PAIN (4)

## 2024-01-11 NOTE — PROGRESS NOTES
Concerns:    Doing well.  No concerns today.  No vaginal bleeding, LOF, contractions.  No HA, RUQ pain, N/V, visual changes.  Labor precautions discussed.  RTC 1 week.  Moralez 8 today  Discussed eIOL, might want to at 40w      Pregnancy Issues              1. Due for pap after delivery              2. Failed 1 hr. passed 3 hr.              3. Anxiety. Self-reyna              4. Inadequate views. Rpt US 32w. DONE. nl                   Prenatal care plan              - prior deliveries: none  -OB Labs of concern: O+                      - Rh- pos              - First trimester screening:  Discussed completed              - Second trimester screening:  Discussed completed              - Labor pain management:                - Ped:                - Circumcision if boy:               - BC:               - Breast feeding:                - Covid 19 vaccine: discussed              - influenza vaccine: done              - Tdap done              - RSV declined      Morales Dejesus MD

## 2024-01-18 ENCOUNTER — PRENATAL OFFICE VISIT (OUTPATIENT)
Dept: FAMILY MEDICINE | Facility: CLINIC | Age: 29
End: 2024-01-18
Payer: COMMERCIAL

## 2024-01-18 DIAGNOSIS — Z34.90 NORMAL PREGNANCY, ANTEPARTUM: Primary | ICD-10-CM

## 2024-01-18 LAB — RUPTURE OF FETAL MEMBRANES BY ROM PLUS: NEGATIVE

## 2024-01-18 PROCEDURE — 84112 EVAL AMNIOTIC FLUID PROTEIN: CPT | Performed by: FAMILY MEDICINE

## 2024-01-18 PROCEDURE — 99207 PR PRENATAL VISIT: CPT | Performed by: FAMILY MEDICINE

## 2024-01-18 PROCEDURE — 59426 ANTEPARTUM CARE ONLY: CPT | Performed by: FAMILY MEDICINE

## 2024-01-18 ASSESSMENT — PAIN SCALES - GENERAL: PAINLEVEL: SEVERE PAIN (7)

## 2024-01-18 NOTE — PROGRESS NOTES
Concerns:    Doing well.  No concerns today.  No vaginal bleeding, LOF, contractions.  No HA, RUQ pain, N/V, visual changes.  Labor precautions discussed.  RTC 1 week.  Planning to discuss IOL next week visit, cervix favorable      Pregnancy Issues              1. Due for pap after delivery              2. Failed 1 hr. passed 3 hr.              3. Anxiety. Self-reyna              4. Inadequate views. Rpt US 32w. DONE. nl                   Prenatal care plan              - prior deliveries: none  -OB Labs of concern: O+                      - Rh- pos              - First trimester screening:  Discussed completed              - Second trimester screening:  Discussed completed              - Labor pain management:                - Ped:                - Circumcision if boy:               - BC:               - Breast feeding:                - Covid 19 vaccine: discussed              - influenza vaccine: done              - Tdap done              - RSV declined      Morales Dejesus MD

## 2024-01-22 ENCOUNTER — MEDICAL CORRESPONDENCE (OUTPATIENT)
Dept: OBGYN | Facility: CLINIC | Age: 29
End: 2024-01-22

## 2024-01-22 ENCOUNTER — ANESTHESIA EVENT (OUTPATIENT)
Dept: OBGYN | Facility: CLINIC | Age: 29
End: 2024-01-22
Payer: COMMERCIAL

## 2024-01-22 ENCOUNTER — HOSPITAL ENCOUNTER (INPATIENT)
Facility: CLINIC | Age: 29
LOS: 2 days | Discharge: HOME | End: 2024-01-24
Attending: FAMILY MEDICINE | Admitting: FAMILY MEDICINE
Payer: COMMERCIAL

## 2024-01-22 ENCOUNTER — ANESTHESIA (OUTPATIENT)
Dept: OBGYN | Facility: CLINIC | Age: 29
End: 2024-01-22
Payer: COMMERCIAL

## 2024-01-22 VITALS
HEART RATE: 100 BPM | RESPIRATION RATE: 18 BRPM | HEIGHT: 64 IN | BODY MASS INDEX: 38.35 KG/M2 | TEMPERATURE: 98 F | OXYGEN SATURATION: 99 % | WEIGHT: 224.6 LBS

## 2024-01-22 DIAGNOSIS — K64.4 EXTERNAL HEMORRHOIDS: ICD-10-CM

## 2024-01-22 DIAGNOSIS — O99.019 ANEMIA AFFECTING PREGNANCY, ANTEPARTUM: ICD-10-CM

## 2024-01-22 DIAGNOSIS — F41.9 ANXIETY: ICD-10-CM

## 2024-01-22 PROBLEM — Z34.90 PREGNANCY: Status: ACTIVE | Noted: 2024-01-22

## 2024-01-22 PROBLEM — Z3A.39 39 WEEKS GESTATION OF PREGNANCY: Status: ACTIVE | Noted: 2024-01-22

## 2024-01-22 LAB
ABO/RH(D): NORMAL
ANTIBODY SCREEN: NEGATIVE
BASOPHILS # BLD AUTO: 0 10E3/UL (ref 0–0.2)
BASOPHILS NFR BLD AUTO: 0 %
EOSINOPHIL # BLD AUTO: 0 10E3/UL (ref 0–0.7)
EOSINOPHIL NFR BLD AUTO: 0 %
ERYTHROCYTE [DISTWIDTH] IN BLOOD BY AUTOMATED COUNT: 13.8 % (ref 10–15)
ERYTHROCYTE [DISTWIDTH] IN BLOOD BY AUTOMATED COUNT: 13.8 % (ref 10–15)
HCT VFR BLD AUTO: 35.8 % (ref 35–47)
HCT VFR BLD AUTO: 36.6 % (ref 35–47)
HGB BLD-MCNC: 11.8 G/DL (ref 11.7–15.7)
HGB BLD-MCNC: 12 G/DL (ref 11.7–15.7)
IMM GRANULOCYTES # BLD: 0.1 10E3/UL
IMM GRANULOCYTES NFR BLD: 1 %
LYMPHOCYTES # BLD AUTO: 0.8 10E3/UL (ref 0.8–5.3)
LYMPHOCYTES NFR BLD AUTO: 6 %
MCH RBC QN AUTO: 27.1 PG (ref 26.5–33)
MCH RBC QN AUTO: 27.4 PG (ref 26.5–33)
MCHC RBC AUTO-ENTMCNC: 32.8 G/DL (ref 31.5–36.5)
MCHC RBC AUTO-ENTMCNC: 33 G/DL (ref 31.5–36.5)
MCV RBC AUTO: 83 FL (ref 78–100)
MCV RBC AUTO: 83 FL (ref 78–100)
MONOCYTES # BLD AUTO: 0.3 10E3/UL (ref 0–1.3)
MONOCYTES NFR BLD AUTO: 2 %
NEUTROPHILS # BLD AUTO: 12.2 10E3/UL (ref 1.6–8.3)
NEUTROPHILS NFR BLD AUTO: 91 %
NRBC # BLD AUTO: 0 10E3/UL
NRBC BLD AUTO-RTO: 0 /100
PLATELET # BLD AUTO: 201 10E3/UL (ref 150–450)
PLATELET # BLD AUTO: 225 10E3/UL (ref 150–450)
RBC # BLD AUTO: 4.31 10E6/UL (ref 3.8–5.2)
RBC # BLD AUTO: 4.42 10E6/UL (ref 3.8–5.2)
RUPTURE OF FETAL MEMBRANES BY ROM PLUS: POSITIVE
SPECIMEN EXPIRATION DATE: NORMAL
T PALLIDUM AB SER QL: NONREACTIVE
WBC # BLD AUTO: 12.7 10E3/UL (ref 4–11)
WBC # BLD AUTO: 13.5 10E3/UL (ref 4–11)

## 2024-01-22 PROCEDURE — 59410 OBSTETRICAL CARE: CPT | Performed by: FAMILY MEDICINE

## 2024-01-22 PROCEDURE — 370N000003 HC ANESTHESIA WARD SERVICE: Performed by: NURSE ANESTHETIST, CERTIFIED REGISTERED

## 2024-01-22 PROCEDURE — 250N000011 HC RX IP 250 OP 636: Performed by: NURSE ANESTHETIST, CERTIFIED REGISTERED

## 2024-01-22 PROCEDURE — 86780 TREPONEMA PALLIDUM: CPT | Performed by: FAMILY MEDICINE

## 2024-01-22 PROCEDURE — 3E0R3BZ INTRODUCTION OF ANESTHETIC AGENT INTO SPINAL CANAL, PERCUTANEOUS APPROACH: ICD-10-PCS | Performed by: FAMILY MEDICINE

## 2024-01-22 PROCEDURE — 85027 COMPLETE CBC AUTOMATED: CPT | Performed by: FAMILY MEDICINE

## 2024-01-22 PROCEDURE — 84112 EVAL AMNIOTIC FLUID PROTEIN: CPT | Performed by: FAMILY MEDICINE

## 2024-01-22 PROCEDURE — 722N000001 HC LABOR CARE VAGINAL DELIVERY SINGLE

## 2024-01-22 PROCEDURE — 85025 COMPLETE CBC W/AUTO DIFF WBC: CPT | Performed by: FAMILY MEDICINE

## 2024-01-22 PROCEDURE — 250N000009 HC RX 250: Performed by: NURSE ANESTHETIST, CERTIFIED REGISTERED

## 2024-01-22 PROCEDURE — 00HU33Z INSERTION OF INFUSION DEVICE INTO SPINAL CANAL, PERCUTANEOUS APPROACH: ICD-10-PCS | Performed by: FAMILY MEDICINE

## 2024-01-22 PROCEDURE — 250N000011 HC RX IP 250 OP 636: Performed by: FAMILY MEDICINE

## 2024-01-22 PROCEDURE — 36415 COLL VENOUS BLD VENIPUNCTURE: CPT | Performed by: FAMILY MEDICINE

## 2024-01-22 PROCEDURE — 250N000013 HC RX MED GY IP 250 OP 250 PS 637: Performed by: FAMILY MEDICINE

## 2024-01-22 PROCEDURE — 0KQM0ZZ REPAIR PERINEUM MUSCLE, OPEN APPROACH: ICD-10-PCS | Performed by: FAMILY MEDICINE

## 2024-01-22 PROCEDURE — 86900 BLOOD TYPING SEROLOGIC ABO: CPT | Performed by: FAMILY MEDICINE

## 2024-01-22 PROCEDURE — G0463 HOSPITAL OUTPT CLINIC VISIT: HCPCS

## 2024-01-22 PROCEDURE — 258N000003 HC RX IP 258 OP 636: Performed by: FAMILY MEDICINE

## 2024-01-22 PROCEDURE — 120N000001 HC R&B MED SURG/OB

## 2024-01-22 RX ORDER — ACETAMINOPHEN 325 MG/1
650 TABLET ORAL EVERY 4 HOURS PRN
Status: DISCONTINUED | OUTPATIENT
Start: 2024-01-22 | End: 2024-01-24 | Stop reason: HOSPADM

## 2024-01-22 RX ORDER — OXYTOCIN/0.9 % SODIUM CHLORIDE 30/500 ML
340 PLASTIC BAG, INJECTION (ML) INTRAVENOUS CONTINUOUS PRN
Status: DISCONTINUED | OUTPATIENT
Start: 2024-01-22 | End: 2024-01-22

## 2024-01-22 RX ORDER — FENTANYL CITRATE 50 UG/ML
100 INJECTION, SOLUTION INTRAMUSCULAR; INTRAVENOUS
Status: DISCONTINUED | OUTPATIENT
Start: 2024-01-22 | End: 2024-01-22

## 2024-01-22 RX ORDER — CARBOPROST TROMETHAMINE 250 UG/ML
250 INJECTION, SOLUTION INTRAMUSCULAR
Status: DISCONTINUED | OUTPATIENT
Start: 2024-01-22 | End: 2024-01-22

## 2024-01-22 RX ORDER — FENTANYL CITRATE-0.9 % NACL/PF 10 MCG/ML
100 PLASTIC BAG, INJECTION (ML) INTRAVENOUS EVERY 5 MIN PRN
Status: DISCONTINUED | OUTPATIENT
Start: 2024-01-22 | End: 2024-01-22

## 2024-01-22 RX ORDER — METHYLERGONOVINE MALEATE 0.2 MG/ML
200 INJECTION INTRAVENOUS
Status: DISCONTINUED | OUTPATIENT
Start: 2024-01-22 | End: 2024-01-24 | Stop reason: HOSPADM

## 2024-01-22 RX ORDER — BUPIVACAINE HYDROCHLORIDE 2.5 MG/ML
INJECTION, SOLUTION EPIDURAL; INFILTRATION; INTRACAUDAL
Status: COMPLETED | OUTPATIENT
Start: 2024-01-22 | End: 2024-01-22

## 2024-01-22 RX ORDER — OXYTOCIN/0.9 % SODIUM CHLORIDE 30/500 ML
100-340 PLASTIC BAG, INJECTION (ML) INTRAVENOUS CONTINUOUS PRN
Status: DISCONTINUED | OUTPATIENT
Start: 2024-01-22 | End: 2024-01-22

## 2024-01-22 RX ORDER — ONDANSETRON 2 MG/ML
4 INJECTION INTRAMUSCULAR; INTRAVENOUS EVERY 6 HOURS PRN
Status: DISCONTINUED | OUTPATIENT
Start: 2024-01-22 | End: 2024-01-22

## 2024-01-22 RX ORDER — NALOXONE HYDROCHLORIDE 0.4 MG/ML
0.4 INJECTION, SOLUTION INTRAMUSCULAR; INTRAVENOUS; SUBCUTANEOUS
Status: DISCONTINUED | OUTPATIENT
Start: 2024-01-22 | End: 2024-01-22

## 2024-01-22 RX ORDER — CITRIC ACID/SODIUM CITRATE 334-500MG
30 SOLUTION, ORAL ORAL
Status: DISCONTINUED | OUTPATIENT
Start: 2024-01-22 | End: 2024-01-22

## 2024-01-22 RX ORDER — ACETAMINOPHEN 325 MG/1
650 TABLET ORAL EVERY 4 HOURS PRN
Status: DISCONTINUED | OUTPATIENT
Start: 2024-01-22 | End: 2024-01-22

## 2024-01-22 RX ORDER — LOPERAMIDE HCL 2 MG
4 CAPSULE ORAL
Status: DISCONTINUED | OUTPATIENT
Start: 2024-01-22 | End: 2024-01-22

## 2024-01-22 RX ORDER — SODIUM CHLORIDE, SODIUM LACTATE, POTASSIUM CHLORIDE, CALCIUM CHLORIDE 600; 310; 30; 20 MG/100ML; MG/100ML; MG/100ML; MG/100ML
INJECTION, SOLUTION INTRAVENOUS CONTINUOUS PRN
Status: DISCONTINUED | OUTPATIENT
Start: 2024-01-22 | End: 2024-01-22

## 2024-01-22 RX ORDER — ONDANSETRON 4 MG/1
4 TABLET, ORALLY DISINTEGRATING ORAL EVERY 6 HOURS PRN
Status: DISCONTINUED | OUTPATIENT
Start: 2024-01-22 | End: 2024-01-22

## 2024-01-22 RX ORDER — HYDROCORTISONE 25 MG/G
CREAM TOPICAL 3 TIMES DAILY PRN
Status: DISCONTINUED | OUTPATIENT
Start: 2024-01-22 | End: 2024-01-24 | Stop reason: HOSPADM

## 2024-01-22 RX ORDER — BISACODYL 10 MG
10 SUPPOSITORY, RECTAL RECTAL DAILY PRN
Status: DISCONTINUED | OUTPATIENT
Start: 2024-01-22 | End: 2024-01-24 | Stop reason: HOSPADM

## 2024-01-22 RX ORDER — MISOPROSTOL 200 UG/1
400 TABLET ORAL
Status: DISCONTINUED | OUTPATIENT
Start: 2024-01-22 | End: 2024-01-22

## 2024-01-22 RX ORDER — PROCHLORPERAZINE 25 MG
25 SUPPOSITORY, RECTAL RECTAL EVERY 12 HOURS PRN
Status: DISCONTINUED | OUTPATIENT
Start: 2024-01-22 | End: 2024-01-22 | Stop reason: HOSPADM

## 2024-01-22 RX ORDER — DOCUSATE SODIUM 100 MG/1
100 CAPSULE, LIQUID FILLED ORAL DAILY
Status: DISCONTINUED | OUTPATIENT
Start: 2024-01-22 | End: 2024-01-24 | Stop reason: HOSPADM

## 2024-01-22 RX ORDER — NALOXONE HYDROCHLORIDE 0.4 MG/ML
0.2 INJECTION, SOLUTION INTRAMUSCULAR; INTRAVENOUS; SUBCUTANEOUS
Status: DISCONTINUED | OUTPATIENT
Start: 2024-01-22 | End: 2024-01-22

## 2024-01-22 RX ORDER — LOPERAMIDE HCL 2 MG
4 CAPSULE ORAL
Status: DISCONTINUED | OUTPATIENT
Start: 2024-01-22 | End: 2024-01-24 | Stop reason: HOSPADM

## 2024-01-22 RX ORDER — PROCHLORPERAZINE MALEATE 5 MG
10 TABLET ORAL EVERY 6 HOURS PRN
Status: DISCONTINUED | OUTPATIENT
Start: 2024-01-22 | End: 2024-01-22

## 2024-01-22 RX ORDER — IBUPROFEN 800 MG/1
800 TABLET, FILM COATED ORAL
Status: DISCONTINUED | OUTPATIENT
Start: 2024-01-22 | End: 2024-01-22

## 2024-01-22 RX ORDER — IBUPROFEN 800 MG/1
800 TABLET, FILM COATED ORAL EVERY 6 HOURS PRN
Status: DISCONTINUED | OUTPATIENT
Start: 2024-01-22 | End: 2024-01-24 | Stop reason: HOSPADM

## 2024-01-22 RX ORDER — OXYTOCIN/0.9 % SODIUM CHLORIDE 30/500 ML
1-24 PLASTIC BAG, INJECTION (ML) INTRAVENOUS CONTINUOUS
Status: DISCONTINUED | OUTPATIENT
Start: 2024-01-22 | End: 2024-01-22

## 2024-01-22 RX ORDER — PROCHLORPERAZINE MALEATE 5 MG
10 TABLET ORAL EVERY 6 HOURS PRN
Status: DISCONTINUED | OUTPATIENT
Start: 2024-01-22 | End: 2024-01-22 | Stop reason: HOSPADM

## 2024-01-22 RX ORDER — KETOROLAC TROMETHAMINE 30 MG/ML
30 INJECTION, SOLUTION INTRAMUSCULAR; INTRAVENOUS
Status: DISCONTINUED | OUTPATIENT
Start: 2024-01-22 | End: 2024-01-22

## 2024-01-22 RX ORDER — OXYTOCIN 10 [USP'U]/ML
10 INJECTION, SOLUTION INTRAMUSCULAR; INTRAVENOUS
Status: DISCONTINUED | OUTPATIENT
Start: 2024-01-22 | End: 2024-01-22

## 2024-01-22 RX ORDER — NALBUPHINE HYDROCHLORIDE 10 MG/ML
2.5-5 INJECTION, SOLUTION INTRAMUSCULAR; INTRAVENOUS; SUBCUTANEOUS EVERY 6 HOURS PRN
Status: DISCONTINUED | OUTPATIENT
Start: 2024-01-22 | End: 2024-01-22

## 2024-01-22 RX ORDER — OXYTOCIN/0.9 % SODIUM CHLORIDE 30/500 ML
340 PLASTIC BAG, INJECTION (ML) INTRAVENOUS CONTINUOUS PRN
Status: DISCONTINUED | OUTPATIENT
Start: 2024-01-22 | End: 2024-01-24 | Stop reason: HOSPADM

## 2024-01-22 RX ORDER — LOPERAMIDE HCL 2 MG
2 CAPSULE ORAL
Status: DISCONTINUED | OUTPATIENT
Start: 2024-01-22 | End: 2024-01-22

## 2024-01-22 RX ORDER — LIDOCAINE HYDROCHLORIDE AND EPINEPHRINE 15; 5 MG/ML; UG/ML
INJECTION, SOLUTION EPIDURAL PRN
Status: DISCONTINUED | OUTPATIENT
Start: 2024-01-22 | End: 2024-01-22

## 2024-01-22 RX ORDER — METOCLOPRAMIDE 5 MG/1
10 TABLET ORAL EVERY 6 HOURS PRN
Status: DISCONTINUED | OUTPATIENT
Start: 2024-01-22 | End: 2024-01-22

## 2024-01-22 RX ORDER — PROCHLORPERAZINE 25 MG
25 SUPPOSITORY, RECTAL RECTAL EVERY 12 HOURS PRN
Status: DISCONTINUED | OUTPATIENT
Start: 2024-01-22 | End: 2024-01-22

## 2024-01-22 RX ORDER — ONDANSETRON 4 MG/1
4 TABLET, ORALLY DISINTEGRATING ORAL EVERY 6 HOURS PRN
Status: DISCONTINUED | OUTPATIENT
Start: 2024-01-22 | End: 2024-01-22 | Stop reason: HOSPADM

## 2024-01-22 RX ORDER — OXYTOCIN 10 [USP'U]/ML
10 INJECTION, SOLUTION INTRAMUSCULAR; INTRAVENOUS
Status: COMPLETED | OUTPATIENT
Start: 2024-01-22 | End: 2024-01-22

## 2024-01-22 RX ORDER — LOPERAMIDE HCL 2 MG
2 CAPSULE ORAL
Status: DISCONTINUED | OUTPATIENT
Start: 2024-01-22 | End: 2024-01-24 | Stop reason: HOSPADM

## 2024-01-22 RX ORDER — ONDANSETRON 2 MG/ML
4 INJECTION INTRAMUSCULAR; INTRAVENOUS EVERY 6 HOURS PRN
Status: DISCONTINUED | OUTPATIENT
Start: 2024-01-22 | End: 2024-01-22 | Stop reason: HOSPADM

## 2024-01-22 RX ORDER — TRANEXAMIC ACID 10 MG/ML
1 INJECTION, SOLUTION INTRAVENOUS EVERY 30 MIN PRN
Status: DISCONTINUED | OUTPATIENT
Start: 2024-01-22 | End: 2024-01-24 | Stop reason: HOSPADM

## 2024-01-22 RX ORDER — METOCLOPRAMIDE HYDROCHLORIDE 5 MG/ML
10 INJECTION INTRAMUSCULAR; INTRAVENOUS EVERY 6 HOURS PRN
Status: DISCONTINUED | OUTPATIENT
Start: 2024-01-22 | End: 2024-01-22

## 2024-01-22 RX ORDER — METHYLERGONOVINE MALEATE 0.2 MG/ML
200 INJECTION INTRAVENOUS
Status: DISCONTINUED | OUTPATIENT
Start: 2024-01-22 | End: 2024-01-22

## 2024-01-22 RX ORDER — OXYTOCIN 10 [USP'U]/ML
10 INJECTION, SOLUTION INTRAMUSCULAR; INTRAVENOUS
Status: DISCONTINUED | OUTPATIENT
Start: 2024-01-22 | End: 2024-01-24 | Stop reason: HOSPADM

## 2024-01-22 RX ORDER — METOCLOPRAMIDE HYDROCHLORIDE 5 MG/ML
10 INJECTION INTRAMUSCULAR; INTRAVENOUS EVERY 6 HOURS PRN
Status: DISCONTINUED | OUTPATIENT
Start: 2024-01-22 | End: 2024-01-22 | Stop reason: HOSPADM

## 2024-01-22 RX ORDER — TRANEXAMIC ACID 10 MG/ML
1 INJECTION, SOLUTION INTRAVENOUS EVERY 30 MIN PRN
Status: DISCONTINUED | OUTPATIENT
Start: 2024-01-22 | End: 2024-01-22

## 2024-01-22 RX ORDER — MODIFIED LANOLIN
OINTMENT (GRAM) TOPICAL
Status: DISCONTINUED | OUTPATIENT
Start: 2024-01-22 | End: 2024-01-24 | Stop reason: HOSPADM

## 2024-01-22 RX ORDER — LIDOCAINE 40 MG/G
CREAM TOPICAL
Status: DISCONTINUED | OUTPATIENT
Start: 2024-01-22 | End: 2024-01-22

## 2024-01-22 RX ORDER — METOCLOPRAMIDE 5 MG/1
10 TABLET ORAL EVERY 6 HOURS PRN
Status: DISCONTINUED | OUTPATIENT
Start: 2024-01-22 | End: 2024-01-22 | Stop reason: HOSPADM

## 2024-01-22 RX ORDER — CARBOPROST TROMETHAMINE 250 UG/ML
250 INJECTION, SOLUTION INTRAMUSCULAR
Status: DISCONTINUED | OUTPATIENT
Start: 2024-01-22 | End: 2024-01-24 | Stop reason: HOSPADM

## 2024-01-22 RX ORDER — MISOPROSTOL 200 UG/1
400 TABLET ORAL
Status: DISCONTINUED | OUTPATIENT
Start: 2024-01-22 | End: 2024-01-24 | Stop reason: HOSPADM

## 2024-01-22 RX ADMIN — Medication: at 15:48

## 2024-01-22 RX ADMIN — LIDOCAINE HYDROCHLORIDE,EPINEPHRINE BITARTRATE 2 ML: 15; .005 INJECTION, SOLUTION EPIDURAL; INFILTRATION; INTRACAUDAL; PERINEURAL at 15:42

## 2024-01-22 RX ADMIN — FENTANYL CITRATE 100 MCG: 50 INJECTION, SOLUTION INTRAMUSCULAR; INTRAVENOUS at 14:27

## 2024-01-22 RX ADMIN — LIDOCAINE HYDROCHLORIDE,EPINEPHRINE BITARTRATE 3 ML: 15; .005 INJECTION, SOLUTION EPIDURAL; INFILTRATION; INTRACAUDAL; PERINEURAL at 15:40

## 2024-01-22 RX ADMIN — ACETAMINOPHEN 650 MG: 325 TABLET, FILM COATED ORAL at 12:09

## 2024-01-22 RX ADMIN — BUPIVACAINE HYDROCHLORIDE 6 ML: 2.5 INJECTION, SOLUTION EPIDURAL; INFILTRATION; INTRACAUDAL; PERINEURAL at 15:46

## 2024-01-22 RX ADMIN — OXYTOCIN 10 UNITS: 10 INJECTION INTRAVENOUS at 20:02

## 2024-01-22 RX ADMIN — SODIUM CHLORIDE, POTASSIUM CHLORIDE, SODIUM LACTATE AND CALCIUM CHLORIDE 1000 ML: 600; 310; 30; 20 INJECTION, SOLUTION INTRAVENOUS at 14:26

## 2024-01-22 ASSESSMENT — ACTIVITIES OF DAILY LIVING (ADL)
ADLS_ACUITY_SCORE: 20

## 2024-01-22 NOTE — H&P
"[facility] Labor and Delivery History and Physical    Jackelin Sal MRN# [id number]   Age: 28 year old YOB: 1995     Date of Admission:  2024    Primary care provider: Nanette Coronado           Chief Complaint:   Jackelin Sal is a 28 year old female who is 39w5d pregnant and being admitted for active labor management.          Pregnancy history:     OBSTETRIC HISTORY:    OB History    Para Term  AB Living   2 0 0 0 1 0   SAB IAB Ectopic Multiple Live Births   1 0 0 0 0      # Outcome Date GA Lbr Chaitanya/2nd Weight Sex Delivery Anes PTL Lv   2 Current            1 SAB      SAB         Obstetric Comments    EDC 2023 based on LMP and confirmed by early ultrasound.   to [place].       EDC: Estimated Date of Delivery: 24    Prenatal Labs:   Lab Results   Component Value Date    AS Negative 2023    HEPBANG Nonreactive 2023    CHPCRT Negative 2020    GCPCRT Negative 2020    HGB 11.4 (L) 2023       GBS Status:   No results found for: \"GBS\"    Active Problem List  Patient Active Problem List   Diagnosis    Family history of cardiac arrhythmia    CARDIOVASCULAR SCREENING; LDL GOAL LESS THAN 160    Anxiety    PCOS (polycystic ovarian syndrome)    Encounter for triage in pregnant patient    Normal pregnancy, antepartum    39 weeks gestation of pregnancy    Pregnancy       Medication Prior to Admission  Medications Prior to Admission   Medication Sig Dispense Refill Last Dose    Prenatal Vit-Fe Fumarate-FA (PRENATAL MULTIVITAMIN W/IRON) 27-0.8 MG tablet Take 1 tablet by mouth daily 90 tablet 7 2024    SUMAtriptan (IMITREX) 25 MG tablet  (Patient not taking: Reported on 2024)      .        Maternal Past Medical History:     Past Medical History:   Diagnosis Date    Concussion 2010    x2    History of migraine     PCOS (polycystic ovarian syndrome)                        Family History:     Family History "   Problem Relation Age of Onset    Depression Mother     Anxiety Disorder Mother     Hyperthyroidism Mother     Heart Disease Father         wpw    Anxiety Disorder Father     Jamar Parkinson White syndrome Father     Hypertension Father     Peripheral Vascular Disease Maternal Grandmother     Thyroid Disease Maternal Grandmother     Hypertension Maternal Grandfather     Deep Vein Thrombosis Maternal Grandfather 40    Hyperlipidemia Paternal Grandmother     Hypertension Paternal Grandfather     No Known Problems Brother     Deep Vein Thrombosis Maternal Aunt 40        has genetic mutation for clotting    Hypothyroidism Maternal Aunt     C.A.D. No family hx of     Breast Cancer No family hx of      Family history reviewed and updated in [facility]            Social History:     Social History     Socioeconomic History    Marital status:      Spouse name: Not on file    Number of children: Not on file    Years of education: Not on file    Highest education level: Not on file   Occupational History    Not on file   Tobacco Use    Smoking status: Never    Smokeless tobacco: Never   Vaping Use    Vaping Use: Not on file   Substance and Sexual Activity    Alcohol use: Yes    Drug use: No    Sexual activity: Yes     Partners: Male     Birth control/protection: None   Other Topics Concern    Parent/sibling w/ CABG, MI or angioplasty before 65F 55M? No   Social History Narrative    6/20223 Lives in [place] with , Jasper.  No indoor cats/kittens.  No smokers in the home.  No concerns about domestic violence.  [facility]  in [place], [place].     Social Determinants of Health     Financial Resource Strain: Low Risk  (12/28/2023)    Financial Resource Strain     Within the past 12 months, have you or your family members you live with been unable to get utilities (heat, electricity) when it was really needed?: No   Food Insecurity: Low Risk  (12/28/2023)    Food Insecurity     Within the past 12 months, did you  worry that your food would run out before you got money to buy more?: No     Within the past 12 months, did the food you bought just not last and you didn t have money to get more?: No   Transportation Needs: Low Risk  (12/28/2023)    Transportation Needs     Within the past 12 months, has lack of transportation kept you from medical appointments, getting your medicines, non-medical meetings or appointments, work, or from getting things that you need?: No   Physical Activity: Not on file   Stress: Not on file   Social Connections: Not on file   Interpersonal Safety: Low Risk  (11/30/2023)    Interpersonal Safety     Do you feel physically and emotionally safe where you currently live?: Yes     Within the past 12 months, have you been hit, slapped, kicked or otherwise physically hurt by someone?: No     Within the past 12 months, have you been humiliated or emotionally abused in other ways by your partner or ex-partner?: No   Housing Stability: Low Risk  (12/28/2023)    Housing Stability     Do you have housing? : Yes     Are you worried about losing your housing?: No            Review of Systems:   CONSTITUTIONAL: NEGATIVE for fever, chills, change in weight  ENT/MOUTH: NEGATIVE for ear, mouth and throat problems  RESP: NEGATIVE for significant cough or SOB  CV: NEGATIVE for chest pain, palpitations or peripheral edema          Physical Exam:   Vitals were reviewed  Temp: 97.5  F (36.4  C) Temp src: Oral BP: 116/63 Pulse: 71   Resp: 16 SpO2: 98 %      Lungs:   No increased work of breathing, good air exchange, clear to auscultation bilaterally, no crackles or wheezing     Cardiovascular:   Normal apical impulse, regular rate and rhythm, normal S1 and S2, no S3 or S4, and no murmur noted     Abdomen:   Gravid, cephalic by Leopold's     Neurologic:   Mental Status Exam:  Level of Alertness:   awake  Orientation:   person, place, time  Deep Tendon Reflexes:  Reflexes are intact and symmetrical bilaterally  Right Knee:   0  Left Knee:  0      Cervix:   Membranes: intact   Dilation: 3   Effacement: 90%   Station:-3   Consistency: soft   Position: Anterior  Presentation:Cephalic  Fetal Heart Rate Tracing: reactive and reassuring, appropriate for gestational age, Tier 1 (normal)  Tocometer: external monitor, frequency q 3-5 minutes, Strength mild to moderate, and Duration 45 seconds                       Assessment:   Jackelin Sal is a 39w5d pregnant female admitted with labor management.          Plan:   Admit - see IP orders  Pain medication as desired per patient.  We discussed oral tylenol to start with vs IV fentanyl and ultimately an epidural.  She is willing to use what is necessary, but will try tylenol now.      Electronically signed by:  Dariusz Gallardo M.D.  1/22/2024

## 2024-01-22 NOTE — ANESTHESIA PREPROCEDURE EVALUATION
Anesthesia Pre-Procedure Evaluation    Patient: Jackelin Sal   MRN: [id number] : 1995        Procedure : * No procedures listed *          Past Medical History:   Diagnosis Date    Concussion 2010    x2    History of migraine     PCOS (polycystic ovarian syndrome)       Past Surgical History:   Procedure Laterality Date    WISDOM TOOTH EXTRACTION        No Known Allergies   Social History     Tobacco Use    Smoking status: Never    Smokeless tobacco: Never   Substance Use Topics    Alcohol use: Yes      Wt Readings from Last 1 Encounters:   24 101.9 kg (224 lb 9.6 oz)        Anesthesia Evaluation   Pt has not had prior anesthetic     No history of anesthetic complications       ROS/MED HX  ENT/Pulmonary:  - neg pulmonary ROS     Neurologic:     (+)      migraines,                          Cardiovascular:     (+) Dyslipidemia - -   -  - -                                      METS/Exercise Tolerance:     Hematologic:  - neg hematologic  ROS     Musculoskeletal:       GI/Hepatic:  - neg GI/hepatic ROS     Renal/Genitourinary:       Endo:  - neg endo ROS     Psychiatric/Substance Use:     (+) psychiatric history anxiety       Infectious Disease:       Malignancy:       Other:     (-) previous  and TOLAC candidate       Physical Exam    Airway        Mallampati: II   TM distance: > 3 FB   Neck ROM: full   Mouth opening: > 3 cm    Respiratory Devices and Support         Dental  no notable dental history         Cardiovascular   cardiovascular exam normal          Pulmonary   pulmonary exam normal                OUTSIDE LABS:  CBC:   Lab Results   Component Value Date    WBC 13.5 (H) 2024    WBC 12.7 (H) 2024    HGB 11.8 2024    HGB 12.0 2024    HCT 35.8 2024    HCT 36.6 2024     2024     2024     BMP:   Lab Results   Component Value Date     2013     2010    POTASSIUM 4.0 2013    POTASSIUM 3.6  "06/06/2010    CHLORIDE 102 05/06/2013    CHLORIDE 104 06/06/2010    CO2 22 05/06/2013    CO2 24 06/06/2010    BUN 15 05/06/2013    BUN 11 06/06/2010    CR 0.54 05/06/2013    CR 0.55 06/06/2010    GLC 96 03/20/2023    GLC 94 05/07/2021     COAGS: No results found for: \"PTT\", \"INR\", \"FIBR\"  POC:   Lab Results   Component Value Date    HCG Negative 06/11/2014     HEPATIC:   Lab Results   Component Value Date    ALBUMIN 4.6 05/06/2013    PROTTOTAL 8.1 05/06/2013    ALT 29 05/06/2013    AST 25 05/06/2013    ALKPHOS 68 05/06/2013    BILITOTAL 0.5 05/06/2013     OTHER:   Lab Results   Component Value Date    A1C 5.0 03/20/2023    TERESO 10.2 05/06/2013    LIPASE 61 05/06/2013    TSH 1.64 03/20/2023    T4 1.02 05/24/2012       Anesthesia Plan    ASA Status:  2       Anesthesia Type: Epidural.              Consents    Anesthesia Plan(s) and associated risks, benefits, and realistic alternatives discussed. Questions answered and patient/representative(s) expressed understanding.     - Discussed: Risks, Benefits and Alternatives for BOTH SEDATION and the PROCEDURE were discussed     - Discussed with:  Patient            Postoperative Care            Comments:    Other Comments: The risks and benefits of anesthesia, and the alternatives where applicable, have been discussed with the patient, and they wish to proceed.          neg OB ROS.      Patrick Lou APRN CRNA    I have reviewed the pertinent notes and labs in the chart from the past 30 days and (re)examined the patient.  Any updates or changes from those notes are reflected in this note.                  "

## 2024-01-22 NOTE — PROGRESS NOTES
S: Patient desires Epidural  B: Patient is a  who presented for spontaneous labor, She is now 6 dialated, FHT's 130  A: 1510 CRNA called and in room at 1525. Patient and procedure correctly identified/verified with CRNA. Consent signed. 700mls fluid bolus given. Patient in position for epidural placement. Epidural placed without complications. Test dose/bolus given by CRNA and patient tolerated well. Patient rates her pain after procedure as 0/10.  R: Will continue to monitor.

## 2024-01-22 NOTE — PROGRESS NOTES
S: Triage Arrival  B: Jackelin is a 28 y.o.  @ 39w 5d who presents with complaint of regular contractions every4-5  A: EFM applied. FHT's130 with moderate variability, accels present, no decels noted on strip. Contractions every 3-5 minutesROM+ collected and sent to lab.  R: Will notify MD to obtain further orders.

## 2024-01-22 NOTE — ANESTHESIA PROCEDURE NOTES
"Epidural catheter Procedure Note    Pre-Procedure   Staff -        CRNA: Sebastian Locke APRN CRNA       Performed By: CRNA       Location: OB       Pre-Anesthestic Checklist: patient identified, IV checked, risks and benefits discussed, informed consent, monitors and equipment checked, pre-op evaluation and at physician/surgeon's request  Timeout:       Correct Patient: Yes        Correct Procedure: Yes        Correct Site: Yes        Correct Position: Yes   Procedure Documentation  Procedure: epidural catheter       Patient Position: sitting       Patient Prep/Sterile Barriers: sterile gloves, mask, patient draped       Skin prep: Betadine       Local skin infiltrated with 3 mL of 1% lidocaine.        Insertion Site: L3-4. (midline approach).       Technique: LORT saline        JORDANA at 5 cm.       Needle Type: Fiberspar needle and Galaviz       Needle Gauge: 18.        Needle Length (Inches): 3.5        Catheter: 20 G.          Catheter threaded easily.         4 cm epidural space.         Threaded 10 cm at skin.         # of attempts: 1 and  # of redirects:  0    Assessment/Narrative         Paresthesias: No.       Test dose of 3 mL lidocaine 1.5% w/ 1:200,000 epinephrine at 15:40 CST.         Test dose negative, 3 minutes after injection, for signs of intravascular, subdural, or intrathecal injection.       Insertion/Infusion Method: LORT saline       Aspiration negative for Heme or CSF via Epidural Catheter.       Sensory Level Left: T7.       Sensory Level Right: T7.    Medication(s) Administered   0.25% Bupivacaine PF (Epidural) - EPIDURAL   6 mL - 1/22/2024 3:46:00 PM    FOR [facility]:   Pain Team Contact information: please page the Pain Team Via [employer]. Search \"Pain\". During daytime hours, please page the attending first. At night please page the resident first.      "

## 2024-01-23 PROBLEM — Z34.90 PREGNANCY: Status: RESOLVED | Noted: 2024-01-22 | Resolved: 2024-01-23

## 2024-01-23 PROBLEM — Z3A.39 39 WEEKS GESTATION OF PREGNANCY: Status: RESOLVED | Noted: 2024-01-22 | Resolved: 2024-01-23

## 2024-01-23 PROBLEM — O99.019 ANEMIA AFFECTING PREGNANCY, ANTEPARTUM: Status: ACTIVE | Noted: 2024-01-23

## 2024-01-23 LAB — HGB BLD-MCNC: 9 G/DL (ref 11.7–15.7)

## 2024-01-23 PROCEDURE — 250N000013 HC RX MED GY IP 250 OP 250 PS 637: Performed by: FAMILY MEDICINE

## 2024-01-23 PROCEDURE — 120N000001 HC R&B MED SURG/OB

## 2024-01-23 PROCEDURE — 36415 COLL VENOUS BLD VENIPUNCTURE: CPT | Performed by: FAMILY MEDICINE

## 2024-01-23 PROCEDURE — 85018 HEMOGLOBIN: CPT | Performed by: FAMILY MEDICINE

## 2024-01-23 RX ORDER — FERROUS SULFATE 325(65) MG
325 TABLET ORAL DAILY
Status: DISCONTINUED | OUTPATIENT
Start: 2024-01-23 | End: 2024-01-24 | Stop reason: HOSPADM

## 2024-01-23 RX ADMIN — FERROUS SULFATE TAB 325 MG (65 MG ELEMENTAL FE) 325 MG: 325 (65 FE) TAB at 11:02

## 2024-01-23 RX ADMIN — IBUPROFEN 800 MG: 800 TABLET ORAL at 06:12

## 2024-01-23 RX ADMIN — ACETAMINOPHEN 650 MG: 325 TABLET, FILM COATED ORAL at 08:58

## 2024-01-23 RX ADMIN — DOCUSATE SODIUM 100 MG: 100 CAPSULE, LIQUID FILLED ORAL at 08:59

## 2024-01-23 RX ADMIN — IBUPROFEN 800 MG: 800 TABLET ORAL at 12:52

## 2024-01-23 RX ADMIN — ACETAMINOPHEN 650 MG: 325 TABLET, FILM COATED ORAL at 02:26

## 2024-01-23 RX ADMIN — IBUPROFEN 800 MG: 800 TABLET ORAL at 22:56

## 2024-01-23 RX ADMIN — Medication: at 02:27

## 2024-01-23 ASSESSMENT — ACTIVITIES OF DAILY LIVING (ADL)
ADLS_ACUITY_SCORE: 20

## 2024-01-23 NOTE — PLAN OF CARE
Goal Outcome Evaluation:      Plan of Care Reviewed With: patient, spouse    Overall Patient Progress: improvingOverall Patient Progress: improving  S: Shift review   B:Jackelin is a  who delivered vaginally on   A: VSS, patient is independent with mobility, pain from hemmorhoid and cramping. Medicated with tylenol, ibuprofen, tucks and dermoplast spray. Recommended tub soaks for comfort. Handles baby with confidence.  R: Continue with routine PP care

## 2024-01-23 NOTE — ANESTHESIA POSTPROCEDURE EVALUATION
Patient: Jackelin Sal    Procedure: * No procedures listed *       Anesthesia Type:  Epidural    Note:  Disposition: Inpatient   Postop Pain Control: Uneventful            Sign Out: Well controlled pain   PONV: No   Neuro/Psych: Uneventful            Sign Out: Acceptable/Baseline neuro status   Airway/Respiratory: Uneventful            Sign Out: Acceptable/Baseline resp. status   CV/Hemodynamics: Uneventful            Sign Out: Acceptable CV status; No obvious hypovolemia; No obvious fluid overload   Other NRE: NONE   DID A NON-ROUTINE EVENT OCCUR? No           Last vitals:  Vitals:    01/23/24 0315 01/23/24 0900 01/23/24 1252   BP: 120/59 136/73 113/63   Pulse:  100 93   Resp: 16 18 18   Temp: 98.7  F (37.1  C) 97.9  F (36.6  C) 98.7  F (37.1  C)   SpO2:  98% 98%       Electronically Signed By: MAGUE Johnson CRNA  January 23, 2024  1:37 PM

## 2024-01-23 NOTE — L&D DELIVERY NOTE
Saurav is a 28-year-old -0-1-0 now P1-0-1-1 who is 39 5/7wks gestation who had prenatal care with Dr. Hamm at the [facility].  The patient's pregnancy was uncomplicated, her blood type is O(+), Hep B NR, Rubella immune, TreponemaAb NR, HIV NR.     The patient presented to L&D with questionable labor and leaking fluid.  She was jethro irregularly but they soon became more regular.      FIRST STAGE: She came in she was jethro about every 3-5 minutes. Pitocin was not started. ROM occurred at 0700. She had small amounts of lightly meconium stained  fluid. She was 3.5 cm/90% effaced/-3 station. She was completely dilated at 18:10. She received an epidural for pain management. This worked very nicely for her.   Total time of first stage of labor was 3 hours and 43 minutes.     SECOND STAGE: She labored down for 1 hour and 10 minutes and then started pushing at 19:20.  She delivered a viable female  over an intact perineum in the CAIT presentation at 19:56.  Second stage was complicated by nothing.     There was spontaneous crying and respirations noted. Baby was suctioned through the mouth and nares at completion of the delivery after it was placed on [facility].  There was not a nuchal cord. The cord was clamped x2 and cut by FOB.   Total time of second stage of labor was 1 hour 46 minutes with only 36 minutes of pushing.     THIRD STAGE: Placenta delivered with active management in the Schultze presentation. There was a 3-vessel cord noted and the placenta was intact and complete on inspection.   Total time of third stage of labor was 5 minutes.     On inspection of the perineum, there was a small second degree laceration on the vaginal floor near the hymenal ring.  This was repaired with 2-0 vicryl using a single interrupted suture.  Fundus was firm after I was done with the repair.  She received Pitocin 10 units IM after delivery of the placenta. Both mom and this female  who weighed  3470 grams or 7 pounds 10 ounces were stable when I left the delivery room. Apgars were 9 and 9 at 1 and 5 minutes respectively.       Electronically signed by:  Dariusz Gallardo M.D.  1/22/2024

## 2024-01-23 NOTE — PROGRESS NOTES
S: Delivery   B: Mom  now. GBS negative, spontaneous labor   A: SROM with light meconium fluid followed by  of  @ 195. Dr. Gallardo present for delivery. Spontaneous lusty cry on moms chest, bulb suctioned. Apgars 9/9   R: Anticipate routine post delivery mom and  cares.

## 2024-01-23 NOTE — PROGRESS NOTES
Subjective  Patient is still very comfortable since getting her epidural.  Contractions are q 3 minutes, FHR is 140s with moderate variability, some small accelerations but no decelerations.  It is very reassuring.     OBJECTIVE:  /71   Pulse 102   Temp 98.6  F (37  C) (Oral)   Resp 20   LMP 04/19/2023 (Exact Date)   SpO2 97%   Breastfeeding No   SVE complete/-2 station per nursing exam as I was entering the room.      Assessment  Active labor, now entering the second stage of labor.      PLAN:  She is not feeling pressure or the urge to push, so will allow her to labor down a bit.  Anticipate that she will start pushing soon and have a vaginal delivery    Electronically signed by:  Dariusz Gallardo M.D.  1/22/2024

## 2024-01-23 NOTE — PROGRESS NOTES
" [facility]    Obstetrics Daily Post-Op Note    Assessment & Plan      -* No surgery found *  Principal Problem:    39 weeks gestation of pregnancy  Active Problems:    Pregnancy     (spontaneous vaginal delivery)       Assessment:  39w5d now  dong well     Plan:  Postpartum:  - Breast feeding   -  No results found for: \"RH\" Rhogam Not indicated  - Immunizations: No results found for: \"RUBELLAABIGG\"  S/p TDaP and flu vaccines  - PPBC:     Post-Op:  - Pain: Tylenol, Motrin. Opiates   - Gonzalez removed, voiding  - Encourage ambulation    Dispo: 1-2        Principal Problem:    39 weeks gestation of pregnancy  Active Problems:    Pregnancy     (spontaneous vaginal delivery)      Morales Dejesus MD    Subjective:  Interval History   Stable.  Doing well.  Improving slowly.  Pain is reasonably controlled.  No fevers. Lochia as expected for this stage.      Exam:  Physical Exam   Vitals:    24 2130 24 2145 24 0015 24 0315   BP: 112/56 105/55 106/48 120/59   BP Location:       Patient Position:       Cuff Size:       Pulse:  82 86    Resp:  16 16 16   Temp:  98  F (36.7  C) 98.8  F (37.1  C) 98.7  F (37.1  C)   TempSrc:  Oral Oral Oral   SpO2:           Constitutional: healthy, alert and no distress  Abdomen:  Uterine fundus is firm, expected tenderness at the level of the umbilicus, incision: No  Extremeties:  No edema, pulses intact, scd's in place      Medications    - MEDICATION INSTRUCTIONS -      - MEDICATION INSTRUCTIONS -      oxytocin         docusate sodium  100 mg Oral Daily       Data   Hemoglobin   Date Value Ref Range Status   2024 9.0 (L) 11.7 - 15.7 g/dL Final   2024 11.8 11.7 - 15.7 g/dL Final   2013 14.5 11.7 - 15.7 g/dL Final   2012 13.8 11.7 - 15.7 g/dL Final       No results found for: \"ABO\"        Lab Results   Component Value Date    GLC 96 2023    GLC 94 2021    GLC 77 2013    GLC 91 " 06/06/2010         Morales Dejesus MD  Cell - [phone]

## 2024-01-24 VITALS
HEART RATE: 99 BPM | TEMPERATURE: 98 F | SYSTOLIC BLOOD PRESSURE: 124 MMHG | BODY MASS INDEX: 36.49 KG/M2 | RESPIRATION RATE: 18 BRPM | DIASTOLIC BLOOD PRESSURE: 73 MMHG | WEIGHT: 212.6 LBS | OXYGEN SATURATION: 98 %

## 2024-01-24 PROCEDURE — 250N000013 HC RX MED GY IP 250 OP 250 PS 637: Performed by: FAMILY MEDICINE

## 2024-01-24 RX ORDER — IBUPROFEN 800 MG/1
800 TABLET, FILM COATED ORAL EVERY 6 HOURS PRN
Qty: 12 TABLET | Refills: 0 | Status: SHIPPED | OUTPATIENT
Start: 2024-01-24 | End: 2024-01-27

## 2024-01-24 RX ORDER — ACETAMINOPHEN 500 MG
500-1000 TABLET ORAL EVERY 6 HOURS PRN
Qty: 24 TABLET | Refills: 0 | Status: SHIPPED | OUTPATIENT
Start: 2024-01-24

## 2024-01-24 RX ORDER — HYDROXYZINE PAMOATE 25 MG/1
25 CAPSULE ORAL 4 TIMES DAILY PRN
Qty: 100 CAPSULE | Refills: 1 | Status: SHIPPED | OUTPATIENT
Start: 2024-01-24

## 2024-01-24 RX ORDER — FERROUS SULFATE 325(65) MG
325 TABLET ORAL DAILY
Qty: 30 TABLET | Refills: 1 | Status: SHIPPED | OUTPATIENT
Start: 2024-01-25

## 2024-01-24 RX ADMIN — DOCUSATE SODIUM 100 MG: 100 CAPSULE, LIQUID FILLED ORAL at 08:40

## 2024-01-24 RX ADMIN — ACETAMINOPHEN 650 MG: 325 TABLET, FILM COATED ORAL at 06:48

## 2024-01-24 RX ADMIN — Medication: at 09:56

## 2024-01-24 RX ADMIN — IBUPROFEN 800 MG: 800 TABLET ORAL at 09:56

## 2024-01-24 RX ADMIN — FERROUS SULFATE TAB 325 MG (65 MG ELEMENTAL FE) 325 MG: 325 (65 FE) TAB at 08:40

## 2024-01-24 ASSESSMENT — ACTIVITIES OF DAILY LIVING (ADL)
ADLS_ACUITY_SCORE: 20

## 2024-01-24 NOTE — DISCHARGE INSTRUCTIONS
"Postpartum: Care Instructions  Overview  After childbirth (postpartum period), your body goes through many changes. Some of these changes happen over several weeks. In the hours after delivery, your body will begin to recover from childbirth while it prepares to breastfeed your . You may feel emotional during this time. Your hormones can shift your mood without warning for no clear reason.  In the first couple of weeks after childbirth, it's common to have emotions that change from happy to sad. You may find it hard to sleep. You may cry a lot. This is called the \"baby blues.\" These overwhelming emotions often go away within a couple of days or weeks. But it's important to discuss your feelings with your doctor.  It's easy to get too tired and overwhelmed during the first weeks after childbirth. Don't try to do too much. Get rest whenever you can, accept help from others, and eat well and drink plenty of fluids.  In the first couple of weeks after you give birth, your doctor or midwife may want to check in with you and make a plan for any follow-up care you may need. You will likely have a complete postpartum visit in the first 3 months after delivery. At that time, your doctor or midwife will check on your recovery from childbirth and see how you're doing with your emotions. You may also discuss your concerns or questions.  Follow-up care is a key part of your treatment and safety. Be sure to make and go to all appointments, and call your doctor if you are having problems. It's also a good idea to know your test results and keep a list of the medicines you take.  How can you care for yourself at home?  Sleep or rest when your baby sleeps.  Get help with household chores from family or friends, if you can. Don't try to do it all yourself.  If you have hemorrhoids or swelling or pain around the opening of your vagina, try using cold and heat. You can put ice or a cold pack on the area for 10 to 20 minutes at a " time. Put a thin cloth between the ice and your skin. Also try sitting in a few inches of warm water (sitz bath) 3 times a day and after bowel movements.  Take pain medicines exactly as directed.  If the doctor gave you a prescription medicine for pain, take it as prescribed.  If you are not taking a prescription pain medicine, ask your doctor if you can take an over-the-counter medicine.  Eat more fiber to avoid constipation. Include foods such as whole-grain breads and cereals, raw vegetables, raw and dried fruits, and beans.  Drink plenty of fluids. If you have kidney, heart, or liver disease and have to limit fluids, talk with your doctor before you increase the amount of fluids you drink.  Do not rinse inside your vagina with fluids (douche).  If you have stitches, keep the area clean by pouring or spraying warm water over the area outside your vagina and anus after you use the toilet.  Keep a list of questions to ask your doctor or midwife. Your questions might be about:  Changes in your breasts, such as lumps or soreness.  When to expect your menstrual period to start again.  What form of birth control is best for you.  Weight you have put on during the pregnancy.  Exercise options.  What foods and drinks are best for you, especially if you are breastfeeding.  Problems you might be having with breastfeeding.  When you can have sex. You may want to talk about lubricants for your vagina.  Any feelings of sadness or restlessness that you are having.  When should you call for help?  Share this information with your partner, family, or a friend. They can help you watch for warning signs.  Call 911  anytime you think you may need emergency care. For example, call if:    You have thoughts of harming yourself, your baby, or another person.     You passed out (lost consciousness).     You have chest pain, are short of breath, or cough up blood.     You have a seizure.   Where to get help 24 hours a day, 7 days a week    If you or someone you know talks about suicide, self-harm, a mental health crisis, a substance use crisis, or any other kind of emotional distress, get help right away. You can:    Call the Suicide and Crisis Lifeline at 988.     Call 1-593-951-TALK (1-321.596.3903).     Text HOME to 471449 to access the Crisis Text Line.   Consider saving these numbers in your phone.  Go to [employer] for more information or to chat online.  Call your doctor now or seek immediate medical care if:    You have signs of hemorrhage (too much bleeding), such as:  Heavy vaginal bleeding. This means that you are soaking through one or more pads in an hour. Or you pass blood clots bigger than an egg.  Feeling dizzy or lightheaded, or you feel like you may faint.  Feeling so tired or weak that you cannot do your usual activities.  A fast or irregular heartbeat.  New or worse belly pain.     You have signs of infection, such as:  A fever.  Frequent or painful urination or blood in your urine.  Vaginal discharge that smells bad.  New or worse belly pain.     You have symptoms of a blood clot in your leg (called a deep vein thrombosis), such as:  Pain in the calf, back of the knee, thigh, or groin.  Swelling in the leg or groin.  A color change on the leg or groin. The skin may be reddish or purplish, depending on your usual skin color.     You have signs of preeclampsia, such as:  Sudden swelling of your face, hands, or feet.  New vision problems (such as dimness, blurring, or seeing spots).  A severe headache.     You have signs of heart failure, such as:  New or increased shortness of breath.  New or worse swelling in your legs, ankles, or feet.  Sudden weight gain, such as more than 2 to 3 pounds in a day or 5 pounds in a week.  Feeling so tired or weak that you cannot do your usual activities.     You had spinal or epidural pain relief and have:  New or worse back pain.  Increased pain, swelling, warmth, or redness at the injection  "site.  Tingling, weakness, or numbness in your legs or groin.   Watch closely for changes in your health, and be sure to contact your doctor if:    Your vaginal bleeding isn't decreasing.     You feel sad, anxious, or hopeless for more than a few days.     You are having problems with your breasts or breastfeeding.   Where can you learn more?  Go to https://www.Ik[employer].net/patiented  Enter Z768 in the search box to learn more about \"Postpartum: Care Instructions.\"  Current as of: July 11, 2023               Content Version: 13.8    6804-3931 [employer].   Care instructions adapted under license by your healthcare professional. If you have questions about a medical condition or this instruction, always ask your healthcare professional. [employer] disclaims any warranty or liability for your use of this information.      "

## 2024-01-24 NOTE — PROGRESS NOTES
S: Discharge  to home    B: Patient had a Vaginal delivery with no complications. Baby girl Baby's name Nancy, breast: . Support person's name Robbie.     A: WNL    R: Discharge instructions reviewed and questions answered.  Belongings gathered and returned to the patient. Agreed to follow up in 6 weeks or sooner with any question or concerns.     Nursing Discharge Checklist:    Pneumovax screened and given, if appropriate: N/A  Influenza vaccine screened and given, if appropriate: N/A  Staples removed (): N/A  Breast milk returned: YES  Hydrogel pads sent home:YES  Birth Certificate Done: YES  Public Health Referral Made: N/A declined  Melonie Parra RN on 2024 at 2:36 PM

## 2024-01-24 NOTE — PROGRESS NOTES
Mother began pumping at 2130, as infant down 12% since birth weight. Dr. Dejesus updated and okay with patient adding supplementation or continuing on with breast feeding overnight and reassess plan of care in AM.

## 2024-01-24 NOTE — PLAN OF CARE
Goal Outcome Evaluation:      Plan of Care Reviewed With: patient, spouse    Overall Patient Progress: improvingOverall Patient Progress: improving    S: Shift review   B:Jackelin is a  who delivered vaginally on 24 at 1956  A: VSS, patient is independent with mobility, pain well controlled with p.o. pain meds. Handles baby with confidence. Breast feeding with the use of a nipple shield. Fundus firm, midline and at umbilicus. Bleeding light, no clots noted. Patient reports hemorrhoids bothersome, writer requested hemorrhoid cream from pharmacy, but out of cream at this time. Patient utilizing dermoplast and tucks pads. PPD score 8.  R: Continue with routine PP care

## 2024-01-24 NOTE — DISCHARGE SUMMARY
[facility] Discharge Summary    Jackelin Sal MRN# [id number]   Age: 28 year old YOB: 1995     Date of Admission:  2024  Date of Discharge::  2024  Admitting Physician:  Dariusz Gallardo MD  Discharge Physician:  Adri Gupta DO     Home clinic: [facility]          Admission Diagnoses:   39 weeks gestation of pregnancy [Z3A.39]  Pregnancy [Z34.90]   (spontaneous vaginal delivery) [O80]          Discharge Diagnosis:     Normal spontaneous vaginal delivery  Intrauterine pregnancy at 39.5 weeks gestation          Procedures:     Procedure(s): No additional procedures performed       No procedures performed during this admission           Medications Prior to Admission:     Medications Prior to Admission   Medication Sig Dispense Refill Last Dose    Prenatal Vit-Fe Fumarate-FA (PRENATAL MULTIVITAMIN W/IRON) 27-0.8 MG tablet Take 1 tablet by mouth daily 90 tablet 7 2024    SUMAtriptan (IMITREX) 25 MG tablet  (Patient not taking: Reported on 2024)                Discharge Medications:     Current Discharge Medication List        START taking these medications    Details   acetaminophen (TYLENOL) 500 MG tablet Take 1-2 tablets (500-1,000 mg) by mouth every 6 hours as needed for mild pain or fever  Qty: 24 tablet, Refills: 0    Associated Diagnoses:  (spontaneous vaginal delivery)      ferrous sulfate (FEROSUL) 325 (65 Fe) MG tablet Take 1 tablet (325 mg) by mouth daily  Qty: 30 tablet, Refills: 1    Associated Diagnoses: Anemia affecting pregnancy, antepartum      hydrOXYzine eloina (VISTARIL) 25 MG capsule Take 1 capsule (25 mg) by mouth 4 times daily as needed for anxiety (up to 4x daily for anxiety)  Qty: 100 capsule, Refills: 1    Associated Diagnoses: Anxiety      ibuprofen (ADVIL/MOTRIN) 800 MG tablet Take 1 tablet (800 mg) by mouth every 6 hours as needed for other (cramping)  Qty: 12 tablet, Refills: 0    Associated Diagnoses:   (spontaneous vaginal delivery)      Phenylephrine HCl 0.25 % SUPP Place 1 suppository rectally 4 times daily as needed (as needed for hemorrhoid discomfort)  Qty: 14 suppository, Refills: 0    Associated Diagnoses: External hemorrhoids           CONTINUE these medications which have NOT CHANGED    Details   Prenatal Vit-Fe Fumarate-FA (PRENATAL MULTIVITAMIN W/IRON) 27-0.8 MG tablet Take 1 tablet by mouth daily  Qty: 90 tablet, Refills: 7    Associated Diagnoses: Encounter for preconception consultation      SUMAtriptan (IMITREX) 25 MG tablet                    Consultations:   No consultations were requested during this admission          Brief History of Labor:   27 yo G2 now  admitted for SROM and labor 24. Pregnancy was uncomplicated. Delivery was an uncomplicated  with repair of a small 2nd degree perineal tear.            Hospital Course:   The patient's hospital course was unremarkable.  On discharge, her pain was well controlled with ibuprofen and tylenol. Scripts were sent for these to continue 3 days post discharge.  Vaginal bleeding is similar to peak menstrual flow.  Voiding without difficulty.  Ambulating well and tolerating a normal diet.  No fever.  Breastfeeding improved and going well.  Infant is stable.  No bowel movement yet. Noted depression screen score of 8, patient reporting more concern for anxiety. Discussed treatment options and patient desiring to trial Hydroxyzine up to QID, script sent. She also had complaints of external hemorrhoid discomfort. We discussed treatment options and patient preferring script for suppositories, prescription sent.  She was discharged on post-partum day #2.    Post-partum hemoglobin:   Hemoglobin   Date Value Ref Range Status   2024 9.0 (L) 11.7 - 15.7 g/dL Final   2013 14.5 11.7 - 15.7 g/dL Final             Discharge Instructions and Follow-Up:     Discharge diet: Regular   Discharge activity: No heavy lifting, pushing, pulling for  6 weeks week(s)  Pelvic rest: abstain from intercourse and do not use tampons for 6 weeks week(s)   Discharge follow-up: Follow up with primary care provider in 3-6 weeks   Wound care: Drink plenty of fluids  Ice to area for comfort  Keep wound clean and dry           Discharge Disposition:     Discharged to home      Attestation:  I have reviewed today's vital signs, notes, medications, labs and imaging.    Adri Gupta, DO

## 2024-01-24 NOTE — PLAN OF CARE
S: Shift review   B:Jackelin is a  who delivered vaginally on   A: VSS, patient is independent with mobility, pain well controlled with p.o. pain meds. Handles baby with confidence.BF improving using a nipple shield to keep infant's tongue down. Hgb 9.0 Pt started on iron orally.  R: Continue with routine PP care Continue to monitor BF closely and have Poppy D CLC see pt tomorrow. Anticipate discontinue tomorrow.Goal Outcome Evaluation:      Plan of Care Reviewed With: patient, spouse    Overall Patient Progress: improvingOverall Patient Progress: improving

## 2024-01-24 NOTE — PLAN OF CARE
Vitals WNL. Fundus firm without massage, midline, at U. Bleeding light, denies clots. Ambulates independently and frequently. Pain in anne area, managed with tylenol, ibuprofen, tucks pads, and dermoplast. Bonding with infant going well. PPD screening is 8, provider is aware and had conversation with patient about this, RN educated patient and  on signs that are emergent. Melonie Parra RN on 1/24/2024 at 11:45 AM    Goal Outcome Evaluation:

## 2024-01-28 NOTE — PROGRESS NOTES
Jackelin Sal  Gender: female  : 1995  [address] [facility] [address]  [phone] (home)   Medical Record: [id number]  Primary Care Provider: Nanette Coronado       [facility]   ?   Discharge Phone Call: Key Words/Key Times       Attempted Calls:   _________    24@ 1215 __________

## 2024-01-29 NOTE — PROGRESS NOTES
"Jackelin Sal  Gender: female  : 1995  [address] [facility] [address]  [phone] (home)   Medical Record: [id number]  Primary Care Provider: Nanette Coronado       [facility]   ?   Discharge Phone Call: Key Words/Key Times     How are you and the baby? good    How are feedings going? good    Voiding & Stooling? good    Any questions or concerns? no    Follow-up appointment? \"Last Friday\"      We want to provide excellent care here at [facility]. Do you have any feedback for us that would help us improve? nothing    Call back COMMENTS:   \"Our care was fabulous, thank you\"      Attempted Calls:   _2024 1329 callback completed per PERLA Powell RN________     __________    "

## 2024-02-29 ENCOUNTER — MEDICAL CORRESPONDENCE (OUTPATIENT)
Dept: HEALTH INFORMATION MANAGEMENT | Facility: CLINIC | Age: 29
End: 2024-02-29

## 2024-02-29 ENCOUNTER — PRENATAL OFFICE VISIT (OUTPATIENT)
Dept: FAMILY MEDICINE | Facility: CLINIC | Age: 29
End: 2024-02-29
Payer: COMMERCIAL

## 2024-02-29 VITALS
HEART RATE: 70 BPM | OXYGEN SATURATION: 98 % | SYSTOLIC BLOOD PRESSURE: 116 MMHG | TEMPERATURE: 97.3 F | DIASTOLIC BLOOD PRESSURE: 64 MMHG | BODY MASS INDEX: 33.46 KG/M2 | WEIGHT: 196 LBS | RESPIRATION RATE: 18 BRPM | HEIGHT: 64 IN

## 2024-02-29 LAB — HGB BLD-MCNC: 13 G/DL (ref 11.7–15.7)

## 2024-02-29 PROCEDURE — 99207 PR POST PARTUM EXAM: CPT | Performed by: FAMILY MEDICINE

## 2024-02-29 PROCEDURE — 36415 COLL VENOUS BLD VENIPUNCTURE: CPT | Performed by: FAMILY MEDICINE

## 2024-03-28 ENCOUNTER — OFFICE VISIT (OUTPATIENT)
Dept: FAMILY MEDICINE | Facility: CLINIC | Age: 29
End: 2024-03-28
Payer: COMMERCIAL

## 2024-03-28 VITALS
BODY MASS INDEX: 34.05 KG/M2 | TEMPERATURE: 97.4 F | HEART RATE: 70 BPM | WEIGHT: 192.2 LBS | RESPIRATION RATE: 18 BRPM | OXYGEN SATURATION: 97 % | SYSTOLIC BLOOD PRESSURE: 104 MMHG | HEIGHT: 63 IN | DIASTOLIC BLOOD PRESSURE: 78 MMHG

## 2024-03-28 DIAGNOSIS — N93.9 VAGINAL BLEEDING: ICD-10-CM

## 2024-03-28 DIAGNOSIS — Z12.4 CERVICAL CANCER SCREENING: Primary | ICD-10-CM

## 2024-03-28 PROCEDURE — 99213 OFFICE O/P EST LOW 20 MIN: CPT | Performed by: FAMILY MEDICINE

## 2024-03-28 PROCEDURE — G0145 SCR C/V CYTO,THINLAYER,RESCR: HCPCS | Performed by: FAMILY MEDICINE

## 2024-03-28 SDOH — HEALTH STABILITY: PHYSICAL HEALTH: ON AVERAGE, HOW MANY MINUTES DO YOU ENGAGE IN EXERCISE AT THIS LEVEL?: 20 MIN

## 2024-03-28 SDOH — HEALTH STABILITY: PHYSICAL HEALTH: ON AVERAGE, HOW MANY DAYS PER WEEK DO YOU ENGAGE IN MODERATE TO STRENUOUS EXERCISE (LIKE A BRISK WALK)?: 3 DAYS

## 2024-03-28 ASSESSMENT — SOCIAL DETERMINANTS OF HEALTH (SDOH): HOW OFTEN DO YOU GET TOGETHER WITH FRIENDS OR RELATIVES?: TWICE A WEEK

## 2024-03-28 ASSESSMENT — PAIN SCALES - GENERAL: PAINLEVEL: NO PAIN (0)

## 2024-03-28 NOTE — PROGRESS NOTES
"  Assessment & Plan       ICD-10-CM    1. Cervical cancer screening  Z12.4 Pap Screen reflex to HPV if ASCUS - recommend age 25 - 29      2. Vaginal bleeding  N93.9 US Pelvic Complete with Transvaginal      3. Postpartum exam  Z39.2          Returns for postpartum exam.  Now 8 weeks out.  Doing well.  Pap updated.  Unremarkable pelvic exam.  Ongoing bleeding.  I like to assess an ultrasound to make sure she does not retained products.        No follow-ups on file.    Morales Dejesus MD  [facility]      Maude Mg is a 28 year old, presenting for the following health issues:  Physical        3/28/2024    11:32 AM   Additional Questions   Roomed by Kelly S.   Accompanied by Markby and Daughter     HPI   Lab Results   Component Value Date    PAP NIL 2019      She returns for her postpartum exam today.  She had a .  Unremarkable.  Prenatal course uncomplicated.  Currently breast-feeding.  Feels well.  Mood controlled nicely.  Had increased vaginal bleeding over the last week.  Some cramping associated with that.  Is improving on its own.  No fever.    Review of Systems  Constitutional, HEENT, cardiovascular, pulmonary, gi and gu systems are negative, except as otherwise noted.      Objective    /78   Pulse 70   Temp 97.4  F (36.3  C) (Temporal)   Resp 18   Ht 1.6 m (5' 3\")   Wt 87.2 kg (192 lb 3.2 oz)   LMP 2023 (Exact Date)   SpO2 97%   Breastfeeding Yes   BMI 34.05 kg/m    Body mass index is 34.05 kg/m .  Physical Exam     Well-appearing.  Heart regular lungs clear unlabored.  Pelvic exam shows a normal external genitalia.  Internal vault is normal.  Cervix normal.  Pap obtained.  Chaperone present.          Signed Electronically by: Morales Dejesus MD    "

## 2024-03-29 ENCOUNTER — HOSPITAL ENCOUNTER (OUTPATIENT)
Dept: ULTRASOUND IMAGING | Facility: CLINIC | Age: 29
Discharge: HOME | End: 2024-03-29
Attending: FAMILY MEDICINE | Admitting: FAMILY MEDICINE
Payer: COMMERCIAL

## 2024-03-29 DIAGNOSIS — N93.9 VAGINAL BLEEDING: ICD-10-CM

## 2024-03-29 PROCEDURE — 76830 TRANSVAGINAL US NON-OB: CPT

## 2024-04-01 LAB
BKR LAB AP GYN ADEQUACY: NORMAL
BKR LAB AP GYN INTERPRETATION: NORMAL
BKR LAB AP HPV REFLEX: NO
BKR LAB AP PREVIOUS ABNORMAL: NORMAL
PATH REPORT.COMMENTS IMP SPEC: NORMAL
PATH REPORT.COMMENTS IMP SPEC: NORMAL
PATH REPORT.RELEVANT HX SPEC: NORMAL

## 2024-09-07 ENCOUNTER — HEALTH MAINTENANCE LETTER (OUTPATIENT)
Age: 29
End: 2024-09-07

## 2025-02-14 ENCOUNTER — MYC MEDICAL ADVICE (OUTPATIENT)
Dept: FAMILY MEDICINE | Facility: CLINIC | Age: 30
End: 2025-02-14
Payer: COMMERCIAL

## 2025-02-14 DIAGNOSIS — N97.9 FEMALE INFERTILITY: Primary | ICD-10-CM

## 2025-02-14 NOTE — TELEPHONE ENCOUNTER
Pended OB/Gyn referral with infertility, please review and sign if appropriate/advise. Thank you.

## 2025-03-09 ENCOUNTER — OFFICE VISIT (OUTPATIENT)
Dept: URGENT CARE | Facility: URGENT CARE | Age: 30
End: 2025-03-09
Payer: COMMERCIAL

## 2025-03-09 VITALS
SYSTOLIC BLOOD PRESSURE: 118 MMHG | BODY MASS INDEX: 33.3 KG/M2 | TEMPERATURE: 98.4 F | RESPIRATION RATE: 12 BRPM | OXYGEN SATURATION: 97 % | HEART RATE: 82 BPM | WEIGHT: 188 LBS | DIASTOLIC BLOOD PRESSURE: 79 MMHG

## 2025-03-09 DIAGNOSIS — H57.11 EYE PAIN, RIGHT: Primary | ICD-10-CM

## 2025-03-09 DIAGNOSIS — S05.01XA ABRASION OF RIGHT CORNEA, INITIAL ENCOUNTER: ICD-10-CM

## 2025-03-09 PROCEDURE — 1125F AMNT PAIN NOTED PAIN PRSNT: CPT | Performed by: FAMILY MEDICINE

## 2025-03-09 PROCEDURE — 3074F SYST BP LT 130 MM HG: CPT | Performed by: FAMILY MEDICINE

## 2025-03-09 PROCEDURE — 99213 OFFICE O/P EST LOW 20 MIN: CPT | Performed by: FAMILY MEDICINE

## 2025-03-09 PROCEDURE — 3078F DIAST BP <80 MM HG: CPT | Performed by: FAMILY MEDICINE

## 2025-03-09 RX ORDER — POLYMYXIN B SULFATE AND TRIMETHOPRIM 1; 10000 MG/ML; [USP'U]/ML
1-2 SOLUTION OPHTHALMIC EVERY 4 HOURS
Qty: 4 ML | Refills: 0 | Status: SHIPPED | OUTPATIENT
Start: 2025-03-09 | End: 2025-03-14

## 2025-03-09 ASSESSMENT — PAIN SCALES - GENERAL: PAINLEVEL_OUTOF10: SEVERE PAIN (7)

## 2025-03-09 NOTE — PROGRESS NOTES
Chief Complaint   Patient presents with    Trauma     Pt accidentally poked in right eye  by her daughter's finger around 4 am this morning. Pt reports fuzzy vision, pain in R eye, tearing a lot due to light sensitivity and trouble opening her eye.     Saurav was seen today for trauma.    Diagnoses and all orders for this visit:    Eye pain, right    Abrasion of right cornea, initial encounter  -     polymixin b-trimethoprim (POLYTRIM) 33041-7.1 UNIT/ML-% ophthalmic solution; Place 1-2 drops into both eyes every 4 hours for 5 days.      ASSESSMENT: Corneal abrasion    PLAN: Proparacaine eyedrop instilled then fluorescein dye testing did show corneal abrasion reviewed with patient about the findings follow up appointment if symptoms dont resolve       SUBJECTIVE: The patient suffered a right corneal abrasion 3 hours ago. Mechanism of injury: daughters nail accidentally poked her eye .    OBJECTIVE: She appears well, vitals are normal. Corneal abrasion noted right eye . RUSSELL, Visual acuity     Awilda Young MD

## 2025-08-05 ENCOUNTER — MYC MEDICAL ADVICE (OUTPATIENT)
Dept: FAMILY MEDICINE | Facility: CLINIC | Age: 30
End: 2025-08-05
Payer: COMMERCIAL

## 2025-08-06 ENCOUNTER — VIRTUAL VISIT (OUTPATIENT)
Dept: FAMILY MEDICINE | Facility: CLINIC | Age: 30
End: 2025-08-06
Payer: COMMERCIAL

## 2025-08-06 DIAGNOSIS — Z34.90 SUPERVISION OF NORMAL PREGNANCY: Primary | ICD-10-CM

## 2025-08-06 PROCEDURE — 99207 PR NO CHARGE NURSE ONLY: CPT | Mod: 93

## 2025-09-01 ENCOUNTER — MYC MEDICAL ADVICE (OUTPATIENT)
Dept: FAMILY MEDICINE | Facility: CLINIC | Age: 30
End: 2025-09-01

## 2025-09-01 ENCOUNTER — HOSPITAL ENCOUNTER (EMERGENCY)
Facility: CLINIC | Age: 30
Discharge: HOME | End: 2025-09-01
Attending: FAMILY MEDICINE | Admitting: FAMILY MEDICINE
Payer: COMMERCIAL

## 2025-09-01 ENCOUNTER — APPOINTMENT (OUTPATIENT)
Dept: ULTRASOUND IMAGING | Facility: CLINIC | Age: 30
End: 2025-09-01
Attending: FAMILY MEDICINE
Payer: COMMERCIAL

## 2025-09-01 VITALS
HEART RATE: 74 BPM | SYSTOLIC BLOOD PRESSURE: 118 MMHG | DIASTOLIC BLOOD PRESSURE: 61 MMHG | TEMPERATURE: 98.6 F | RESPIRATION RATE: 16 BRPM | WEIGHT: 188.05 LBS | BODY MASS INDEX: 33.31 KG/M2 | OXYGEN SATURATION: 99 %

## 2025-09-01 DIAGNOSIS — O20.8 SUBCHORIONIC HEMORRHAGE IN FIRST TRIMESTER: Primary | ICD-10-CM

## 2025-09-01 LAB
ALBUMIN UR-MCNC: NEGATIVE MG/DL
APPEARANCE UR: CLEAR
BACTERIA #/AREA URNS HPF: ABNORMAL /HPF
BILIRUB UR QL STRIP: NEGATIVE
COLOR UR AUTO: ABNORMAL
GLUCOSE UR STRIP-MCNC: NEGATIVE MG/DL
HGB UR QL STRIP: ABNORMAL
KETONES UR STRIP-MCNC: NEGATIVE MG/DL
LEUKOCYTE ESTERASE UR QL STRIP: NEGATIVE
NITRATE UR QL: NEGATIVE
PH UR STRIP: 6.5 [PH] (ref 5–7)
RBC URINE: 2 /HPF
SP GR UR STRIP: 1 (ref 1–1.03)
UROBILINOGEN UR STRIP-MCNC: NORMAL MG/DL
WBC URINE: 2 /HPF

## 2025-09-01 PROCEDURE — 99285 EMERGENCY DEPT VISIT HI MDM: CPT | Mod: 25 | Performed by: FAMILY MEDICINE

## 2025-09-01 PROCEDURE — 76801 OB US < 14 WKS SINGLE FETUS: CPT

## 2025-09-01 PROCEDURE — 99284 EMERGENCY DEPT VISIT MOD MDM: CPT | Mod: 25

## 2025-09-01 PROCEDURE — 81001 URINALYSIS AUTO W/SCOPE: CPT | Performed by: FAMILY MEDICINE

## 2025-09-01 ASSESSMENT — ACTIVITIES OF DAILY LIVING (ADL)
ADLS_ACUITY_SCORE: 42
ADLS_ACUITY_SCORE: 42

## 2025-09-01 ASSESSMENT — COLUMBIA-SUICIDE SEVERITY RATING SCALE - C-SSRS
6. HAVE YOU EVER DONE ANYTHING, STARTED TO DO ANYTHING, OR PREPARED TO DO ANYTHING TO END YOUR LIFE?: NO
2. HAVE YOU ACTUALLY HAD ANY THOUGHTS OF KILLING YOURSELF IN THE PAST MONTH?: NO
1. IN THE PAST MONTH, HAVE YOU WISHED YOU WERE DEAD OR WISHED YOU COULD GO TO SLEEP AND NOT WAKE UP?: NO